# Patient Record
Sex: MALE | Race: BLACK OR AFRICAN AMERICAN | URBAN - METROPOLITAN AREA
[De-identification: names, ages, dates, MRNs, and addresses within clinical notes are randomized per-mention and may not be internally consistent; named-entity substitution may affect disease eponyms.]

---
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
## 2020-05-01 ENCOUNTER — OUTPATIENT (OUTPATIENT)
Dept: OUTPATIENT SERVICES | Facility: HOSPITAL | Age: 20
LOS: 1 days | End: 2020-05-01
Payer: MEDICAID

## 2020-05-01 ENCOUNTER — OUTPATIENT (OUTPATIENT)
Dept: OUTPATIENT SERVICES | Facility: HOSPITAL | Age: 20
LOS: 1 days | End: 2020-05-01

## 2020-05-01 PROCEDURE — G9001: CPT

## 2020-05-23 ENCOUNTER — EMERGENCY (EMERGENCY)
Facility: HOSPITAL | Age: 20
LOS: 0 days | Discharge: HOME | End: 2020-05-23
Attending: EMERGENCY MEDICINE
Payer: COMMERCIAL

## 2020-05-23 VITALS
RESPIRATION RATE: 16 BRPM | WEIGHT: 130.07 LBS | HEART RATE: 91 BPM | TEMPERATURE: 98 F | DIASTOLIC BLOOD PRESSURE: 70 MMHG | HEIGHT: 66 IN | SYSTOLIC BLOOD PRESSURE: 136 MMHG

## 2020-05-23 DIAGNOSIS — Z71.1 PERSON WITH FEARED HEALTH COMPLAINT IN WHOM NO DIAGNOSIS IS MADE: ICD-10-CM

## 2020-05-23 DIAGNOSIS — F99 MENTAL DISORDER, NOT OTHERWISE SPECIFIED: ICD-10-CM

## 2020-05-23 PROCEDURE — 99283 EMERGENCY DEPT VISIT LOW MDM: CPT

## 2020-05-23 NOTE — ED PROVIDER NOTE - OBJECTIVE STATEMENT
18 y/o M who denies pmhx presents after he was picked up by EMS as pt was asking for the time while holding a metal broom stick. Pt states he is homeless and carries his broomstick for protection but he did not threaten anyone with it. Pt is currently feeling well with no discomfort and no desire to hurt anyone. Pt in ED has been cooperative with no signs for aggression. Pt denies any HI/SI, or visual/auditory hallucinations. Pt left home and states parents are in [place] but he prefers not to talk as to why he is homeless.

## 2020-05-23 NOTE — ED ADULT TRIAGE NOTE - CHIEF COMPLAINT QUOTE
brought by ems for psych evaluation. pt was standing by someone house with a metal broomstick and they called 911. pt states he is homeless . he was also taking respiradol and stopped in 2018

## 2020-05-23 NOTE — ED PROVIDER NOTE - PSYCHIATRIC, MLM
Alert and oriented to person, place, time/situation. Pt appears hyperactive with normal mood, no apparent risk to self or others.

## 2020-05-23 NOTE — ED ADULT NURSE NOTE - OBJECTIVE STATEMENT
Patient is 19 year old male who presents to ED after police was called on him. Patient reports he knocked the door on a random house to ask the time,, pt states he was holding a broom because he is homeless but home owners felt threatened. Patient denies attempts to harm home owners, denies SI/HI, visual or auditory hallucinations, denies feelings of sadness, hopelessness or depression.

## 2020-05-23 NOTE — ED PROVIDER NOTE - CLINICAL SUMMARY MEDICAL DECISION MAKING FREE TEXT BOX
pt brought in by EMS/police for questionable behavior but exhibiting no psychosis or aggression while monitored for 2+ hours in ED - otherwise will dc.

## 2020-05-23 NOTE — ED PROVIDER NOTE - PATIENT PORTAL LINK FT
You can access the FollowMyHealth Patient Portal offered by [facility] by registering at the following website: [email or web address]. By joining [employer]’s FollowMyHealth portal, you will also be able to view your health information using other applications (apps) compatible with our system.

## 2020-05-23 NOTE — ED PROVIDER NOTE - CONSTITUTIONAL, MLM
Pt appears hyperactive, awake, alert, oriented to person, place, time/situation and in no apparent distress. normal...

## 2020-05-25 ENCOUNTER — INPATIENT (INPATIENT)
Facility: HOSPITAL | Age: 20
LOS: 8 days | Discharge: HOME | End: 2020-06-03
Attending: PSYCHIATRY & NEUROLOGY | Admitting: PSYCHIATRY & NEUROLOGY
Payer: MEDICAID

## 2020-05-25 VITALS
DIASTOLIC BLOOD PRESSURE: 77 MMHG | HEART RATE: 91 BPM | TEMPERATURE: 97 F | OXYGEN SATURATION: 100 % | SYSTOLIC BLOOD PRESSURE: 125 MMHG | RESPIRATION RATE: 18 BRPM

## 2020-05-25 DIAGNOSIS — F39 UNSPECIFIED MOOD [AFFECTIVE] DISORDER: ICD-10-CM

## 2020-05-25 LAB
ANION GAP SERPL CALC-SCNC: 8 MMOL/L — SIGNIFICANT CHANGE UP (ref 7–14)
APAP SERPL-MCNC: <5 UG/ML — LOW (ref 10–30)
BASOPHILS # BLD AUTO: 0.03 K/UL — SIGNIFICANT CHANGE UP (ref 0–0.2)
BASOPHILS NFR BLD AUTO: 0.6 % — SIGNIFICANT CHANGE UP (ref 0–1)
BUN SERPL-MCNC: 20 MG/DL — SIGNIFICANT CHANGE UP (ref 10–20)
CALCIUM SERPL-MCNC: 9.4 MG/DL — SIGNIFICANT CHANGE UP (ref 8.5–10.1)
CHLORIDE SERPL-SCNC: 104 MMOL/L — SIGNIFICANT CHANGE UP (ref 98–110)
CO2 SERPL-SCNC: 25 MMOL/L — SIGNIFICANT CHANGE UP (ref 17–32)
CREAT SERPL-MCNC: 0.9 MG/DL — SIGNIFICANT CHANGE UP (ref 0.3–1)
EOSINOPHIL # BLD AUTO: 0.31 K/UL — SIGNIFICANT CHANGE UP (ref 0–0.7)
EOSINOPHIL NFR BLD AUTO: 5.8 % — SIGNIFICANT CHANGE UP (ref 0–8)
ETHANOL SERPL-MCNC: <10 MG/DL — SIGNIFICANT CHANGE UP
GLUCOSE SERPL-MCNC: 106 MG/DL — HIGH (ref 70–99)
HCT VFR BLD CALC: 45.1 % — SIGNIFICANT CHANGE UP (ref 42–52)
HGB BLD-MCNC: 14.7 G/DL — SIGNIFICANT CHANGE UP (ref 14–18)
IMM GRANULOCYTES NFR BLD AUTO: 0.2 % — SIGNIFICANT CHANGE UP (ref 0.1–0.3)
LYMPHOCYTES # BLD AUTO: 1.73 K/UL — SIGNIFICANT CHANGE UP (ref 1.2–3.4)
LYMPHOCYTES # BLD AUTO: 32.2 % — SIGNIFICANT CHANGE UP (ref 20.5–51.1)
MCHC RBC-ENTMCNC: 29.1 PG — SIGNIFICANT CHANGE UP (ref 27–31)
MCHC RBC-ENTMCNC: 32.6 G/DL — SIGNIFICANT CHANGE UP (ref 32–37)
MCV RBC AUTO: 89.3 FL — SIGNIFICANT CHANGE UP (ref 80–94)
MONOCYTES # BLD AUTO: 0.51 K/UL — SIGNIFICANT CHANGE UP (ref 0.1–0.6)
MONOCYTES NFR BLD AUTO: 9.5 % — HIGH (ref 1.7–9.3)
NEUTROPHILS # BLD AUTO: 2.79 K/UL — SIGNIFICANT CHANGE UP (ref 1.4–6.5)
NEUTROPHILS NFR BLD AUTO: 51.7 % — SIGNIFICANT CHANGE UP (ref 42.2–75.2)
NRBC # BLD: 0 /100 WBCS — SIGNIFICANT CHANGE UP (ref 0–0)
PLATELET # BLD AUTO: 207 K/UL — SIGNIFICANT CHANGE UP (ref 130–400)
POTASSIUM SERPL-MCNC: 4.1 MMOL/L — SIGNIFICANT CHANGE UP (ref 3.5–5)
POTASSIUM SERPL-SCNC: 4.1 MMOL/L — SIGNIFICANT CHANGE UP (ref 3.5–5)
RBC # BLD: 5.05 M/UL — SIGNIFICANT CHANGE UP (ref 4.7–6.1)
RBC # FLD: 12.2 % — SIGNIFICANT CHANGE UP (ref 11.5–14.5)
SALICYLATES SERPL-MCNC: <0.3 MG/DL — LOW (ref 4–30)
SODIUM SERPL-SCNC: 137 MMOL/L — SIGNIFICANT CHANGE UP (ref 135–146)
WBC # BLD: 5.38 K/UL — SIGNIFICANT CHANGE UP (ref 4.8–10.8)
WBC # FLD AUTO: 5.38 K/UL — SIGNIFICANT CHANGE UP (ref 4.8–10.8)

## 2020-05-25 PROCEDURE — 93010 ELECTROCARDIOGRAM REPORT: CPT

## 2020-05-25 PROCEDURE — 99285 EMERGENCY DEPT VISIT HI MDM: CPT

## 2020-05-25 PROCEDURE — 90792 PSYCH DIAG EVAL W/MED SRVCS: CPT | Mod: 95

## 2020-05-25 RX ORDER — RISPERIDONE 4 MG/1
1 TABLET ORAL ONCE
Refills: 0 | Status: COMPLETED | OUTPATIENT
Start: 2020-05-25 | End: 2020-05-25

## 2020-05-25 RX ADMIN — RISPERIDONE 1 MILLIGRAM(S): 4 TABLET ORAL at 22:40

## 2020-05-25 NOTE — ED BEHAVIORAL HEALTH NOTE - BEHAVIORAL HEALTH NOTE
PRE-HOSPITAL COURSE  ===================  SOURCE:  Second-hand information via EMR documentation and primary RN.  DETAILS: Patient self-presented to the ED with no noted incidents.   ===================  ED COURSE:    Calm and coopearive. Follows commands, occasinoaly rocks back and forth. Denies SI. Ridpersdal 1mg at 10mg, able to undderstyand. Hygien – unkempt, malodourous, he received food and drank. No urine yet. Naramón.   SOURCE:  Second-hand information via EMR documentation and primary RN [employer].  ARRIVAL:  Patient self-presented to the ED with no noted incidents.  BELONGINGS:  Clothing; nothing of note in belongings.   BEHAVIOR: Complied with triage protocols –provided blood/no urine as of yet, changed into a hospital gown, allowed staff to wand/collect belongings without incident, denies SI, endorses HI - no plan identified, noted to follow commands, is cooperative, but is mildly anxious - observed rocking back and forth on the stretcher, speech is at a normal rate, appropriate volume, clear/audible, clear articulation/tone, linear thought content, fair impulse control, fair insight/judgment, no noted AVH/delusions, good eye contact, poor hygiene/grooming - is malodorous, unkempt, is AXO3, ate a meal and has been drinking fluids, no aggression or behavioral issues reported.  TREATMENT: Given Risperdal, restraints, security interventions or manual holds required.   VISITORS: Is unaccompanied by family or social supports. PRE-HOSPITAL COURSE  ===================  SOURCE:  Second-hand information via EMR documentation and primary RN.  DETAILS: Patient self-presented to the ED with no noted incidents.   ===================  ED COURSE:    SOURCE:  Second-hand information via EMR documentation and primary RN Genet.  BELONGINGS:  Clothing; nothing of note in belongings.   BEHAVIOR: Complied with triage protocols –provided blood/no urine as of yet, changed into a hospital gown, allowed staff to wand/collect belongings without incident, denies SI, endorses HI - no plan identified, noted to follow commands, is cooperative, but is mildly anxious - observed rocking back and forth on the stretcher, speech is at a normal rate, appropriate volume, clear/audible, clear articulation/tone, linear thought content, fair impulse control, fair insight/judgment, no noted AVH/delusions, good eye contact, poor hygiene/grooming - is malodorous, unkempt, is AXO3, ate a meal and has been drinking fluids, no aggression or behavioral issues reported.  TREATMENT: Given Risperdal 1mg ~22:18, no restraints, security interventions or manual holds required.   VISITORS: Is unaccompanied by family or social supports.

## 2020-05-25 NOTE — ED BEHAVIORAL HEALTH ASSESSMENT NOTE - DETAILS
"I don't care" HPI "we all nuts" no details known reports traumatized many times, derails when pressed for details self d/w EM

## 2020-05-25 NOTE — ED PEDIATRIC TRIAGE NOTE - CHIEF COMPLAINT QUOTE
patient has thoughts of harming others. alert and in no distress. patient has thoughts of harming others. alert and in no distress. 1:1 sit initiated in triage.

## 2020-05-25 NOTE — ED BEHAVIORAL HEALTH ASSESSMENT NOTE - ORIENTED TO PLACE
----- Message from Autumn Bella sent at 1/24/2018  9:35 AM CST -----    Calling to  Speak to the  Nurse about  The  Med  For  Toe  fungas ,  Pt  Received a  Script  And is  Not  Sure  This  Is  The   Right  Med // please call //[phone]//    Yes

## 2020-05-25 NOTE — ED PROVIDER NOTE - OBJECTIVE STATEMENT
19yr old male hx of schizophrenia on risperidol here for psych eval. pt reports brought himself here because he was having thoughts of hurting others. Pt states im not going to tell you the details. denies drug use. no SI.

## 2020-05-25 NOTE — ED PROVIDER NOTE - PROGRESS NOTE DETAILS
s/o Dr. Bhatt. await completion of consult by telepsych Telepsych will look for a bed for patient to be admitted

## 2020-05-25 NOTE — ED BEHAVIORAL HEALTH ASSESSMENT NOTE - VIOLENCE RISK FACTORS:
Violent ideation/threat/speech/History of being victimized/traumatized/Feeling of being under threat and being unable to control threat/Noncompliance with treatment/Substance abuse/Irritability/Impulsivity

## 2020-05-25 NOTE — ED PROVIDER NOTE - NS ED ROS FT
Review of Systems    Constitutional: (-) fever  Eyes/ENT: (-) blurry vision, (-) epistaxis  Cardiovascular: (-) chest pain, (-) syncope  Respiratory: (-) cough, (-) shortness of breath  Gastrointestinal: (-) vomiting, (-) diarrhea  Musculoskeletal: (-) neck pain, (-) back pain, (-) joint pain  Integumentary: (-) rash, (-) edema  Neurological: (-) headache, (-) altered mental status  Psychiatric: see hpi, no hallucinations  Allergic/Immunologic: (-) pruritus  All other systems are negative except as mentioned above
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
## 2020-05-25 NOTE — ED PEDIATRIC NURSE NOTE - OBJECTIVE STATEMENT
pt presents to ED c/o homicidal thoughts pt states he brought himself here because of his thoughts. pt states "I want to hurt multiple people I don't want to name specifics but it is multiple people. " pt denies plan at this time, but states "I could have a plan if I have access." pt currently calm and cooperative. 1:1 initiated in triage, but undressed belongings given to security ( 1 pair of shoes , 2 shirts, 1 jacket, 1 pants an d1 underwear) . pt states this has happened before and was seen here .

## 2020-05-25 NOTE — ED PEDIATRIC NURSE NOTE - LOW RISK FALLS INTERVENTIONS (SCORE 7-11)
Oriented - self; Oriented - place; Oriented - time
Orientation to room/Use of non-skid footwear for ambulating patients, use of appropriate size clothing to prevent risk of tripping/Bed in low position, brakes on
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
## 2020-05-25 NOTE — ED PROVIDER NOTE - PHYSICAL EXAMINATION
CON: calm ENT:  neck supple,  CV: rrr, equal pulses b/l, RESP: cta b/l, GI:  soft, nontender, no rebound, no guarding, SKIN: no rash, MSK: no deformities, NEURO: no gross motor or sensory deficit Psychiatric: weird affect. cooperative
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
## 2020-05-25 NOTE — ED PEDIATRIC NURSE NOTE - NS ED NURSE RECORD ANOTHER HT AND WT
HPI     DLS: 04/13/2018    OHTN / + fam h/o glc - mom/brother   S/p phaco w/IOL OS 3/17/16   S/p CE with IOL OD ([facility]) - 4/13/17     Patient states she is no longer seeing the flashes but is still seeing a   floater in left left eye. Patient states it looks like a piece of hair   that just floats around. Patient states at her last visit she was very   stressed. She says her blood pressure and BS has improved since her last   visit. She also says that her left eye is sore/pressure.      AT's PRN OU    Last edited by Kristin Jaimes on 5/7/2018  1:13 PM. (History)            Assessment /Plan     For exam results, see Encounter Report.        PVD (posterior vitreous detachment), left eye                  No retinal holes, tears, or detachments              Discussed RD precautions           Type 2 diabetes mellitus with hyperglycemia, with long-term current use of insulin  Essential hypertension              No retinopathy, monitor yearly    Presbyopia   Rx specs    RTC 1 year, sooner PRN                      Yes

## 2020-05-25 NOTE — ED BEHAVIORAL HEALTH ASSESSMENT NOTE - OTHER PAST PSYCHIATRIC HISTORY (INCLUDE DETAILS REGARDING ONSET, COURSE OF ILLNESS, INPATIENT/OUTPATIENT TREATMENT)
multiple ED visits. hx of tx with risperdal. references "1 or two nights" in the hospital, unclear if ED/CPEP vs ever admitted.

## 2020-05-25 NOTE — ED BEHAVIORAL HEALTH ASSESSMENT NOTE - DESCRIPTION (FIRST USE, LAST USE, QUANTITY, FREQUENCY, DURATION)
1ppd occasional last about 2 months ago occasional last 1 month ago occasional last 1 month ago (insufflated)

## 2020-05-26 DIAGNOSIS — F84.0 AUTISTIC DISORDER: ICD-10-CM

## 2020-05-26 DIAGNOSIS — F17.200 NICOTINE DEPENDENCE, UNSPECIFIED, UNCOMPLICATED: ICD-10-CM

## 2020-05-26 LAB
AMPHET UR-MCNC: NEGATIVE — SIGNIFICANT CHANGE UP
APPEARANCE UR: CLEAR — SIGNIFICANT CHANGE UP
BACTERIA # UR AUTO: ABNORMAL
BARBITURATES UR SCN-MCNC: NEGATIVE — SIGNIFICANT CHANGE UP
BENZODIAZ UR-MCNC: NEGATIVE — SIGNIFICANT CHANGE UP
BILIRUB UR-MCNC: NEGATIVE — SIGNIFICANT CHANGE UP
COCAINE METAB.OTHER UR-MCNC: NEGATIVE — SIGNIFICANT CHANGE UP
COLOR SPEC: YELLOW — SIGNIFICANT CHANGE UP
DIFF PNL FLD: NEGATIVE — SIGNIFICANT CHANGE UP
DRUG SCREEN 1, URINE RESULT: SIGNIFICANT CHANGE UP
EPI CELLS # UR: ABNORMAL /HPF
GLUCOSE UR QL: NEGATIVE MG/DL — SIGNIFICANT CHANGE UP
KETONES UR-MCNC: NEGATIVE — SIGNIFICANT CHANGE UP
LEUKOCYTE ESTERASE UR-ACNC: ABNORMAL
METHADONE UR-MCNC: NEGATIVE — SIGNIFICANT CHANGE UP
NITRITE UR-MCNC: NEGATIVE — SIGNIFICANT CHANGE UP
OPIATES UR-MCNC: NEGATIVE — SIGNIFICANT CHANGE UP
PCP UR-MCNC: NEGATIVE — SIGNIFICANT CHANGE UP
PH UR: 7 — SIGNIFICANT CHANGE UP (ref 5–8)
PROPOXYPHENE QUALITATIVE URINE RESULT: NEGATIVE — SIGNIFICANT CHANGE UP
PROT UR-MCNC: NEGATIVE MG/DL — SIGNIFICANT CHANGE UP
RBC CASTS # UR COMP ASSIST: SIGNIFICANT CHANGE UP /HPF
SARS-COV-2 RNA SPEC QL NAA+PROBE: SIGNIFICANT CHANGE UP
SP GR SPEC: 1.02 — SIGNIFICANT CHANGE UP (ref 1.01–1.03)
THC UR QL: NEGATIVE — SIGNIFICANT CHANGE UP
UROBILINOGEN FLD QL: 0.2 MG/DL — SIGNIFICANT CHANGE UP (ref 0.2–0.2)
WBC UR QL: ABNORMAL /HPF

## 2020-05-26 PROCEDURE — 99232 SBSQ HOSP IP/OBS MODERATE 35: CPT

## 2020-05-26 RX ORDER — HYDROXYZINE HCL 10 MG
50 TABLET ORAL EVERY 6 HOURS
Refills: 0 | Status: DISCONTINUED | OUTPATIENT
Start: 2020-05-26 | End: 2020-06-03

## 2020-05-26 RX ORDER — NICOTINE POLACRILEX 2 MG
2 GUM BUCCAL
Refills: 0 | Status: DISCONTINUED | OUTPATIENT
Start: 2020-05-26 | End: 2020-06-03

## 2020-05-26 RX ORDER — HALOPERIDOL DECANOATE 100 MG/ML
5 INJECTION INTRAMUSCULAR EVERY 6 HOURS
Refills: 0 | Status: DISCONTINUED | OUTPATIENT
Start: 2020-05-26 | End: 2020-06-03

## 2020-05-26 RX ORDER — RISPERIDONE 4 MG/1
1 TABLET ORAL
Refills: 0 | Status: DISCONTINUED | OUTPATIENT
Start: 2020-05-26 | End: 2020-05-28

## 2020-05-26 RX ORDER — NICOTINE POLACRILEX 2 MG
1 GUM BUCCAL DAILY
Refills: 0 | Status: DISCONTINUED | OUTPATIENT
Start: 2020-05-26 | End: 2020-06-03

## 2020-05-26 RX ORDER — HALOPERIDOL DECANOATE 100 MG/ML
5 INJECTION INTRAMUSCULAR EVERY 6 HOURS
Refills: 0 | Status: DISCONTINUED | OUTPATIENT
Start: 2020-05-26 | End: 2020-05-26

## 2020-05-26 RX ADMIN — RISPERIDONE 1 MILLIGRAM(S): 4 TABLET ORAL at 11:28

## 2020-05-26 RX ADMIN — RISPERIDONE 1 MILLIGRAM(S): 4 TABLET ORAL at 20:43

## 2020-05-26 NOTE — H&P ADULT - NSHPPHYSICALEXAM_GEN_ALL_CORE
Constitutional: NAD, well-nourished, non toxic appearing   HEENT: Airway patent, moist MM, no erythema/swelling/deformity of oral structures. EOMI, PERRLA.  CV: regular rate, regular rhythm, well-perfused extremities, 2+ b/l DP and radial pulses equal.  Lungs: BCTA, no increased WOB.  ABD: NTND, no guarding or rebound, no pulsatile mass, no hernias.   MSK: Neck supple, nontender, nl ROM, no stepoff. Chest nontender. Back nontender in TLS spine or to b/l bony structures or flanks. Ext nontender, nl rom, no deformity.   INTEG: Skin warm, dry, no rash.  NEURO: A&Ox3, normal strength, nl sensation throughout, normal speech.   PSYCH: Denies SI/HI/hallucinations ICU Vital Signs Last 24 Hrs  T(C): 35.6 (26 May 2020 05:29), Max: 37.1 (26 May 2020 02:41)  T(F): 96.1 (26 May 2020 05:29), Max: 98.7 (26 May 2020 02:41)  HR: 67 (26 May 2020 05:29) (67 - 106)  BP: 131/67 (26 May 2020 05:29) (101/63 - 131/67)    RR: 18 (26 May 2020 05:29) (17 - 18)  SpO2: 100% (26 May 2020 04:00) (100% - 100%)      Constitutional: NAD, well-nourished, non toxic appearing   HEENT: Airway patent, moist MM, no erythema/swelling/deformity of oral structures. EOMI, PERRLA.  CV: regular rate, regular rhythm, well-perfused extremities, 2+ b/l DP and radial pulses equal.  Lungs: BCTA, no increased WOB.  ABD: NTND, no guarding or rebound, no pulsatile mass, no hernias.   MSK: Neck supple, nontender, nl ROM, no stepoff. Chest nontender. Back nontender in TLS spine or to b/l bony structures or flanks. Ext nontender, nl rom, no deformity.   INTEG: Skin warm, dry, no rash.  NEURO: A&Ox3, normal strength, nl sensation throughout, normal speech.   PSYCH: Denies SI/hallucinations

## 2020-05-26 NOTE — H&P ADULT - NSHPLABSRESULTS_GEN_ALL_CORE
14.7   5.38  )-----------( 207      ( 25 May 2020 22:56 )             45.1     05-25    137  |  104  |  20  ----------------------------<  106<H>  4.1   |  25  |  0.9    Ca    9.4      25 May 2020 22:56      Alcohol, Blood (05.25.20 @ 20:19)    Alcohol, Blood: <10 mg/dL    Acetaminophen Level, Serum (05.25.20 @ 22:56)    Acetaminophen Level, Serum: <5.0 ug/mL
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
## 2020-05-26 NOTE — PROGRESS NOTE BEHAVIORAL HEALTH - NSBHADDHXSUBSTFT_PSY_A_CORE
Pt endorses smoking 1/2 pack of cigarettes daily. Endorses occasional drinking, however states "rare". Denies other illicit substances, denies marijuana and heroin despite chart review stating pt endorsing occasional marijuana and heroin use. Pt endorses smoking 1/2 pack of cigarettes daily for the last year. Endorses occasional drinking, however states "rare", last use 1 month ago. Endorses smoking marijuana one-time last month. Denies other illicit substances; denies heroin use despite chart review showing pt endorsing previous heroin use in ED.
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
## 2020-05-26 NOTE — PROGRESS NOTE BEHAVIORAL HEALTH - NSBHADMITMEDEDUDETAILS_A_CORE FT
Reeducated pt on risks and benefits of risperdal, and side effects profile. Pt verbalized understanding.

## 2020-05-26 NOTE — PATIENT PROFILE BEHAVIORAL HEALTH - TEACHING/LEARNING FACTORS INFLUENCE READINESS TO LEARN
Date of Service: 08/20/2018    HISTORY OF PRESENT ILLNESS:  This patient is a most pleasant 79-year-old male with active medical conditions including history of noninsulin-dependent diabetes, severe COPD (followed by Dr. Barlow, pulmonary medicine) a history of prostate carcinoma, hypertension, and osteoarthritis of shoulders (status post intervention of right shoulder with Dr. Omer),  as well as diffuse rheumatoid arthritis, of the lungs/bronchiectasis (followed DrHattie by [facility] and medically managed), who presents for followup of chronic active medical conditions.  He tells me in general he has been feeling pretty well.  He states his COPD has been relatively stable.  He does cough up some thick mucus.  It is not unusual for him.  He states that it seems to be a little harder at times to cough it up.  Denies any fever or chills.  Does state upon questioning today, that in fact, he does have some Mucinex at home, but has not used it and will restart it.    He is noticing some bruising, but states he did bump the door.  He states he did have some urinary issues and in fact had a urinary accident when out golfing.  He states if he gets quite short of breath this seems to bring this on.    SOCIAL HISTORY, ALLERGIES AND MEDICATIONS:  Reviewed from Epic encounter of 08/20/2018.    PHYSICAL EXAMINATION:  VITAL SIGNS:  Per the nurse, blood pressure 130/64, pulse 68 and regular, respiratory rate 16, weight is 189 pounds, 5 feet 9 inches tall.  CONSTITUTIONAL SYMPTOMS:  Patient is in no acute distress.  HEENT: Head normocephalic and atraumatic.  Pupils are equal and reactive.  EOMI.  Sclerae and conjunctivae are clear.  External ears appear normal.  Nose normal.  NECK:  Supple without any thyromegaly or other masses.   LYMPHATICS:  Palpation of the anterior/posterior cervical and supraclavicular regions reveals no evidence of any lymphadenopathy.    LUNGS:  Breath sounds are clear, but decreased throughout.  A few fine  crackles are noted at the bases.    HEART:  Regular rate and rhythm, normal S1 and S2, no murmurs, rubs, gallops or thrills are noted.    EXTREMITIES AND MUSCULOSKELETAL:  Muscle strength and tone are normal in the upper and lower extremities.  No evidence of clubbing, cyanosis or edema.  Dorsalis pedis pulses are strong and intact bilaterally.  He does have 1+ lower extremity edema to the mid calf, left and below mid calf on the right.  There is no skin breakdown or other concerning findings.  NEUROLOGIC:  Patient is alert and oriented x 3, pleasant and cooperative.  Gait is normal.  Sensation is intact in the extremities.   VASCULAR:  Carotid upstrokes are brisk bilaterally.  There are no carotid bruits.       LABORATORY EVALUATION:  This was reviewed with the patient.  Comprehensive metabolic panel reveals normal electrolytes other than a carbon dioxide of 33.  Liver function studies normal. Creatinine is increased at 1.56, glucose 84.  Cholesterol 142, LDL 73, HDL 54, triglycerides 74.  Iron level is 32.  Hemoglobin stable at 10.9 grams per deciliter.  White count and platelet count are normal. MCV is stable 87.3.  PSA is less than 0.01.    ASSESSMENT AND PLAN:  1.  Chronic obstructive pulmonary disease.  He is under the care and direction of Dr. Barlow. He is medically managed.  He is going to restart Mucinex to help loosen the phlegm.  2.  History of prostate carcinoma.  PSA is undetectable.  3.  Hypertension.  Blood pressure is well controlled.  4.  History of noninsulin-dependent diabetes.  Blood sugars are normal.  5.  Renal insufficiency, stage III.  As noted, it seems to be rising from his baseline of about 1.3 or so in the past.  He understands the importance of hydration.  Will continue to monitor this.  Talked briefly about anti-inflammatories.  It does not appear he takes this, at least on a regular basis.  6.  Anemia with low iron.  We talked about this.  He does not complain of any obvious  colorectal issues or abdominal pain, etc.  At this point, will restart Iron on Zpamht-Glpkbmasi-Gkiylt as he has this at home.  He will see us in 4 months' time.  He had an EGD with Dr. Castillo last year.  He is not due for colonoscopy until 2025.  Biopsy in this area showed some nonspecific reactive changes, but did not show any intestinal metaplasia.    Again, he will see us in 3 months' time at his kind request.      Dictated By: Gunner Alan MD  Signing Provider: Gunner Alan MD    DN/SMR ([id number])  DD: 08/20/2018 11:02:36 TD: 08/21/2018 05:24:47    Copy Sent To:    none

## 2020-05-26 NOTE — ED PEDIATRIC NURSE REASSESSMENT NOTE - NS ED NURSE REASSESS COMMENT FT2
Pt was going to bathroom to urinate, but he is refusing anyone to watch him as per protocol. Pt getting agitaede, sat down on the floor in a corner. He was able to listyenined to the RN and got up. Pt then said if some one shd watch him them he will not urinate, he agreed for partially close the door while we can still see him in view, but he later turn down the offer and came back to bed. Constant onservation continue in full view. Pt is going to sleep.
Received pt alert and oriented in bed , calm all the time he was her as per out going RN. Pt denies pains, is occasionally seen rocking his body back and forward. Pt obeys instructions, he is homicidal but has no plans put in place. 1;1 constant observation in place. Pt blood drown as per MD for lab work. No respiratory distress noted. Pt had tele video call, was calm, eat some food provided. Safety precoutions in place.
Pt is calm, was sleeping when his EMT came to transferred him to [facility] for further treatment as an admitted pt. Pt remains calm, ff command, security returned his belonging to his. Pt left the unit on a scratcher with his 1;1 staff to accompany him on the ambulance.
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
## 2020-05-26 NOTE — H&P ADULT - ASSESSMENT
20 yo M transfer from [facility] with psychiatric hx of PTSD, ASD and polysubstance abuse presents with homicidal thoughts    admitted to psych   care as per IPP  smoking cessation - nicotine patch   f/u drug screen   regular diet   dvt ppx - ambulate

## 2020-05-27 LAB
ALBUMIN SERPL ELPH-MCNC: 4.1 G/DL — SIGNIFICANT CHANGE UP (ref 3.5–5.2)
ALP SERPL-CCNC: 71 U/L — SIGNIFICANT CHANGE UP (ref 30–115)
ALT FLD-CCNC: 20 U/L — SIGNIFICANT CHANGE UP (ref 13–38)
ANION GAP SERPL CALC-SCNC: 6 MMOL/L — LOW (ref 7–14)
AST SERPL-CCNC: 20 U/L — SIGNIFICANT CHANGE UP (ref 13–38)
BILIRUB SERPL-MCNC: 0.5 MG/DL — SIGNIFICANT CHANGE UP (ref 0.2–1.2)
BUN SERPL-MCNC: 17 MG/DL — SIGNIFICANT CHANGE UP (ref 10–20)
CALCIUM SERPL-MCNC: 9.3 MG/DL — SIGNIFICANT CHANGE UP (ref 8.5–10.1)
CHLORIDE SERPL-SCNC: 105 MMOL/L — SIGNIFICANT CHANGE UP (ref 98–110)
CHOLEST SERPL-MCNC: 144 MG/DL — SIGNIFICANT CHANGE UP (ref 82–200)
CO2 SERPL-SCNC: 29 MMOL/L — SIGNIFICANT CHANGE UP (ref 17–32)
CREAT SERPL-MCNC: 1 MG/DL — SIGNIFICANT CHANGE UP (ref 0.3–1)
GLUCOSE SERPL-MCNC: 84 MG/DL — SIGNIFICANT CHANGE UP (ref 70–99)
HDLC SERPL-MCNC: 46 MG/DL — SIGNIFICANT CHANGE UP
LIPID PNL WITH DIRECT LDL SERPL: 89 MG/DL — SIGNIFICANT CHANGE UP (ref 4–129)
POTASSIUM SERPL-MCNC: 4.6 MMOL/L — SIGNIFICANT CHANGE UP (ref 3.5–5)
POTASSIUM SERPL-SCNC: 4.6 MMOL/L — SIGNIFICANT CHANGE UP (ref 3.5–5)
PROLACTIN SERPL-MCNC: 18 NG/ML — SIGNIFICANT CHANGE UP (ref 4.1–18.4)
PROT SERPL-MCNC: 6.5 G/DL — SIGNIFICANT CHANGE UP (ref 6.1–8)
SODIUM SERPL-SCNC: 140 MMOL/L — SIGNIFICANT CHANGE UP (ref 135–146)
TOTAL CHOLESTEROL/HDL RATIO MEASUREMENT: 3.1 RATIO — LOW (ref 4–5.5)
TRIGL SERPL-MCNC: 70 MG/DL — SIGNIFICANT CHANGE UP (ref 10–149)
TSH SERPL-MCNC: 0.19 UIU/ML — LOW (ref 0.27–4.2)

## 2020-05-27 PROCEDURE — 99231 SBSQ HOSP IP/OBS SF/LOW 25: CPT

## 2020-05-27 RX ADMIN — RISPERIDONE 1 MILLIGRAM(S): 4 TABLET ORAL at 20:09

## 2020-05-27 RX ADMIN — RISPERIDONE 1 MILLIGRAM(S): 4 TABLET ORAL at 08:04

## 2020-05-27 NOTE — PROGRESS NOTE ADULT - SUBJECTIVE AND OBJECTIVE BOX
pt stable alert in NAD  no new complaints    PYSCHOSIS  ^PSYCH EVAL  Handoff  MEWS Score  No pertinent past medical history  Psychosis  Tobacco use disorder  Autism spectrum disorder  Mood disorder  No significant past surgical history  PSYCH EVAL  90+    HEALTH ISSUES - PROBLEM Dx:  Tobacco use disorder  Autism spectrum disorder  Mood disorder        PAST MEDICAL & SURGICAL HISTORY:  No pertinent past medical history  No significant past surgical history    No Known Allergies      FAMILY HISTORY:      haloperidol     Tablet 5 milliGRAM(s) Oral every 6 hours PRN  hydrOXYzine hydrochloride 50 milliGRAM(s) Oral every 6 hours PRN  LORazepam     Tablet 2 milliGRAM(s) Oral every 6 hours PRN  nicotine  Polacrilex Gum 2 milliGRAM(s) Oral every 2 hours PRN  nicotine -   7 mG/24Hr(s) Patch 1 Patch Transdermal daily  risperiDONE   Tablet 1 milliGRAM(s) Oral two times a day      T(C): 35.7 (05-27-20 @ 06:24), Max: 35.7 (05-27-20 @ 06:24)  HR: 16 (05-27-20 @ 06:24) (16 - 83)  BP: 121/59 (05-27-20 @ 06:24) (115/69 - 121/59)  RR: 18 (05-27-20 @ 06:24) (16 - 18)  SpO2: --    PE;  general:  no acute cahnges noted    Lungs:    Heart:    EXT:    Neuro:  no defciits      14.7  45.1  5.38  20  0.9  4.1  106      05-25    137  |  104  |  20  ----------------------------<  106<H>  4.1   |  25  |  0.9    Ca    9.4      25 May 2020 22:56                                  14.7   5.38  )-----------( 207      ( 25 May 2020 22:56 )             45.1       CAPILLARY BLOOD GLUCOSE
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
## 2020-05-28 DIAGNOSIS — Z71.89 OTHER SPECIFIED COUNSELING: ICD-10-CM

## 2020-05-28 PROCEDURE — 99231 SBSQ HOSP IP/OBS SF/LOW 25: CPT

## 2020-05-28 RX ORDER — RISPERIDONE 4 MG/1
2 TABLET ORAL
Refills: 0 | Status: DISCONTINUED | OUTPATIENT
Start: 2020-05-28 | End: 2020-06-03

## 2020-05-28 RX ADMIN — RISPERIDONE 1 MILLIGRAM(S): 4 TABLET ORAL at 08:09

## 2020-05-28 RX ADMIN — Medication 1 PATCH: at 20:10

## 2020-05-28 RX ADMIN — Medication 1 PATCH: at 08:09

## 2020-05-28 RX ADMIN — RISPERIDONE 2 MILLIGRAM(S): 4 TABLET ORAL at 20:07

## 2020-05-28 NOTE — PROGRESS NOTE BEHAVIORAL HEALTH - NSBHFUPINTERVALHXFT_PSY_A_CORE
Pt seen and evaluated during treatment team, chart reviewed. As per nursing report, pt had no acute events overnight, medication compliant and no behavioral issues. On evaluation, pt endorses improved mood, sleep and appetite. Pt states anger is improved, denies current HI, intent and plan. Pt agreed to discuss details of event leading to HI prior to hospitalization- pt endorses paranoia, states he is being followed by the  which upsets him, states if they continue to follow him he will "deal with them" while making a motion of his fist hitting his hand. Pt states he feels safe on the unit. Denies AVH, paranoia. Pt denies medication side effects. Denies SI. Pt seen and evaluated during treatment team, chart reviewed. As per nursing report, pt had no acute events overnight, medication compliant and no behavioral issues. On evaluation, pt endorses improved mood, sleep and appetite. Pt states anger is improved, denies current HI, intent and plan. Pt agreed to discuss details of event leading to HI prior to hospitalization- pt endorses paranoia, states he is being followed by the  which upsets him, states if they continue to follow him he will "deal with them" while making a motion of his fist hitting his hand. Also states he believes the  can hear him. Pt states he feels safe on the unit. Denies AVH. Pt denies medication side effects. Denies SI. Pt seen and evaluated during treatment team, chart reviewed. As per nursing report, pt had no acute events overnight, medication compliant and no behavioral issues. On evaluation, pt endorses improved mood, sleep and appetite. Pt states anger is improved, denies current HI, intent and plan. Pt agreed to discuss details of event leading to HI prior to hospitalization- pt endorses paranoia, states he is being followed by the  which upsets him, states if they continue to follow him he will "deal with them" while making a motion of his fist hitting his hand. Also states he believes the  can hear him. Pt states he feels safe on the unit and is in good behavioral control. Denies AVH. Pt denies medication side effects. Denies SI.

## 2020-05-28 NOTE — CHART NOTE - NSCHARTNOTEFT_GEN_A_CORE
Vicki remains on the unit for continued treatment, safety and observation. He met with treatment team to discuss his treatment plan and medications.  He engaged in interview with staff and reports feeling better but is still isolative on the unit. During team meeting NP and SW discussed with Vicki current goals while on the unit. Vicki reported that he is fine and feels better. SW and NP continued to discuss the HI that initially brought him into the hospital. Vicki reported that he wants “[facility]” because people have been following him. SW asked who has been following him and Vicki responded  have been following him his whole life and can hear him right now. Pt began to get very agitated and his voice increased. Vicki reports not caring if he gets locked up and that we can lock him in here if we want. SW and NP were able to redirect the pt and bring him back to baseline. [facility] is still being explored with HRA form to be completed for potential placement within their residential program. SW will continue to follow.    Mood – Agitated  Sleep - Good  Appetite - Good  ADLs - Good  Thought Process – Paranoid   Observation – [id number]    Tootie will continue to meet with  one on one and with treatment team daily.  At this time patient is not psychiatrically stable for discharge. Vicki remains on the unit for continued treatment, safety and observation. He met with treatment team to discuss his treatment plan and medications.  He engaged in interview with staff and reports feeling better but is still isolative on the unit. During team meeting NP and SW discussed with Vicki current goals while on the unit. Vicki reported that he is fine and feels better. SW and NP continued to discuss the HI that initially brought him into the hospital. Vicki reported that he wants “[facility]” because people have been following him. SW asked who has been following him and Vicki responded  have been following him his whole life and can hear him right now. Pt began to get very agitated and his voice increased. Vicki reports not caring if he gets locked up and that we can lock him in here if we want. SW and NP were able to redirect the pt and bring him back to baseline. [facility] is still being explored with HRA form to be completed for potential placement within their residential program. SW will continue to follow.    Mood – Agitated  Sleep - Good  Appetite - Good  ADLs - Good  Thought Process – Paranoid   Observation – [id number]    Vicki will continue to meet with  one on one and with treatment team daily.  At this time patient is not psychiatrically stable for discharge.

## 2020-05-29 LAB
A1C WITH ESTIMATED AVERAGE GLUCOSE RESULT: 5.5 % — SIGNIFICANT CHANGE UP (ref 4–5.6)
ESTIMATED AVERAGE GLUCOSE: 111 MG/DL — SIGNIFICANT CHANGE UP (ref 68–114)

## 2020-05-29 PROCEDURE — 99231 SBSQ HOSP IP/OBS SF/LOW 25: CPT

## 2020-05-29 RX ADMIN — Medication 1 PATCH: at 08:13

## 2020-05-29 RX ADMIN — RISPERIDONE 2 MILLIGRAM(S): 4 TABLET ORAL at 20:07

## 2020-05-29 RX ADMIN — RISPERIDONE 2 MILLIGRAM(S): 4 TABLET ORAL at 08:12

## 2020-05-29 NOTE — PROGRESS NOTE BEHAVIORAL HEALTH - NSBHFUPINTERVALHXFT_PSY_A_CORE
Pt seen and evaluated, chart reviewed. As per nursing report, pt had no acute events overnight, medication compliant and no behavioral issues. On evaluation, pt endorses improved mood, sleep and appetite. Pt states anger is improved, denies current HI, intent and plan. Endorses continued paranoia of being followed by the , encouraged and educated pt on responses other than anger and violence, pt verbalized acknowledgement. Pt remains in good behavioral control. Denies AVH. Pt denies medication side effects. Denies SI. Encouraged groups. Pt educated on risks and benefits of [facility], pt states will consider. Pt seen and evaluated, chart reviewed. As per nursing report, pt had no acute events overnight, medication compliant and no behavioral issues. On evaluation, pt endorses improved mood, sleep and appetite. Pt states anger is improved, denies current HI, intent and plan. Endorses continued paranoia of being followed by the , encouraged and educated pt on responses other than anger and violence, pt verbalized acknowledgement. Pt remains in good behavioral control, states feels safe on the unit. Denies AVH. Pt denies medication side effects. Denies SI. Encouraged groups. Pt educated on risks and benefits of [facility], pt states will consider.

## 2020-05-29 NOTE — CHART NOTE - NSCHARTNOTEFT_GEN_A_CORE
SW spoke with [facility] about [facility]. [facility] is willing to accept pt but would need HRA form completed. SW spoke with Pt who consented to and agreed for HRA form to be completed. Pt signed consent form and SW completed and submitted HRA form. Copy was also submitted to [facility] for review. SW will continue to follow.

## 2020-05-29 NOTE — PROGRESS NOTE ADULT - SUBJECTIVE AND OBJECTIVE BOX
pt stable alert in NAD  no new complaints    PYSCHOSIS  ^PSYCH EVAL  Handoff  MEWS Score  No pertinent past medical history  Psychosis  Tobacco use disorder  Autism spectrum disorder  Mood disorder  No significant past surgical history  PSYCH EVAL  90+    HEALTH ISSUES - PROBLEM Dx:  Tobacco use disorder  Autism spectrum disorder  Mood disorder        PAST MEDICAL & SURGICAL HISTORY:  No pertinent past medical history  No significant past surgical history    No Known Allergies      FAMILY HISTORY:      haloperidol     Tablet 5 milliGRAM(s) Oral every 6 hours PRN  hydrOXYzine hydrochloride 50 milliGRAM(s) Oral every 6 hours PRN  LORazepam     Tablet 2 milliGRAM(s) Oral every 6 hours PRN  nicotine  Polacrilex Gum 2 milliGRAM(s) Oral every 2 hours PRN  nicotine -   7 mG/24Hr(s) Patch 1 Patch Transdermal daily  risperiDONE   Tablet 2 milliGRAM(s) Oral two times a day      T(C): 35.6 (05-29-20 @ 07:56), Max: 36.2 (05-29-20 @ 06:32)  HR: 80 (05-29-20 @ 07:56) (74 - 97)  BP: 127/80 (05-29-20 @ 07:56) (121/57 - 130/58)  RR: 16 (05-29-20 @ 07:56) (16 - 16)  SpO2: --    PE;  general:  no changes noted in nad    Lungs:    Heart:    EXT:    Neuro:  aelrt nod eficits      --  --  --  17  1.0  4.6  84                      CAPILLARY BLOOD GLUCOSE

## 2020-05-30 PROCEDURE — 99231 SBSQ HOSP IP/OBS SF/LOW 25: CPT

## 2020-05-30 RX ADMIN — RISPERIDONE 2 MILLIGRAM(S): 4 TABLET ORAL at 08:14

## 2020-05-30 RX ADMIN — RISPERIDONE 2 MILLIGRAM(S): 4 TABLET ORAL at 20:05

## 2020-05-30 NOTE — PROGRESS NOTE BEHAVIORAL HEALTH - NSBHFUPINTERVALHXFT_PSY_A_CORE
Pt seen and evaluated, chart reviewed. As per nursing report, pt had no acute events overnight, medication compliant and no behavioral issues. On evaluation, pt endorses improved mood, sleep and appetite. Pt states anger is improved, denies current HI, intent and plan. Endorses continued paranoia of being followed by the , encouraged and educated pt on responses other than anger and violence, pt verbalized acknowledgement. Pt remains in good behavioral control, states feels safe on the unit. Denies AVH. Pt denies medication side effects. Denies SI. Encouraged groups. Pt educated on risks and benefits of [facility], pt states will consider.

## 2020-05-31 PROCEDURE — 99231 SBSQ HOSP IP/OBS SF/LOW 25: CPT

## 2020-05-31 RX ADMIN — RISPERIDONE 2 MILLIGRAM(S): 4 TABLET ORAL at 09:08

## 2020-05-31 RX ADMIN — RISPERIDONE 2 MILLIGRAM(S): 4 TABLET ORAL at 20:07

## 2020-06-01 PROCEDURE — 99231 SBSQ HOSP IP/OBS SF/LOW 25: CPT

## 2020-06-01 RX ADMIN — RISPERIDONE 2 MILLIGRAM(S): 4 TABLET ORAL at 20:56

## 2020-06-01 RX ADMIN — Medication 50 MILLIGRAM(S): at 20:56

## 2020-06-01 RX ADMIN — RISPERIDONE 2 MILLIGRAM(S): 4 TABLET ORAL at 08:00

## 2020-06-01 NOTE — CHART NOTE - NSCHARTNOTEFT_GEN_A_CORE
Vicki remains on the unit for continued treatment, safety and observation. He met with treatment team to discuss his treatment plan and medications.  He engaged in interview with staff and reports feeling better but is still isolative on the unit. Vicki reports that he would like [facility] be [facility]C and asked  to look into shelter placement. SW provided education about current D/C plan that might be able to bypass the shelter as well as provide better support. Pt reports that anger is less and that he would really like to be D/C. SW stated that they would explore all options to ensure a safe D/C. Pt understood. SW and NP continued to address groups and isolative behavior. Pt reports that he would try to attend groups. SW left a message for [facility] to discuss D/C plan. As of this writing HRA application is currently under review. SW will continue to follow.         Mood – Good  Sleep - Good  Appetite - Good  ADLs - Good  Thought Process – Paranoid   Observation – [id number]    Vicki will continue to meet with  one on one and with treatment team daily.  At this time patient is not psychiatrically stable for discharge.

## 2020-06-01 NOTE — PROGRESS NOTE ADULT - SUBJECTIVE AND OBJECTIVE BOX
pt stable alert in NAD  no new complaints    PYSCHOSIS  ^PSYCH EVAL  Handoff  MEWS Score  No pertinent past medical history  Psychosis  Tobacco use disorder  Autism spectrum disorder  Mood disorder  No significant past surgical history  PSYCH EVAL  90+    HEALTH ISSUES - PROBLEM Dx:  Tobacco use disorder  Autism spectrum disorder  Mood disorder        PAST MEDICAL & SURGICAL HISTORY:  No pertinent past medical history  No significant past surgical history    No Known Allergies      FAMILY HISTORY:      haloperidol     Tablet 5 milliGRAM(s) Oral every 6 hours PRN  hydrOXYzine hydrochloride 50 milliGRAM(s) Oral every 6 hours PRN  LORazepam     Tablet 2 milliGRAM(s) Oral every 6 hours PRN  nicotine  Polacrilex Gum 2 milliGRAM(s) Oral every 2 hours PRN  nicotine -   7 mG/24Hr(s) Patch 1 Patch Transdermal daily  risperiDONE   Tablet 2 milliGRAM(s) Oral two times a day      T(C): 35.3 (06-01-20 @ 05:47), Max: 35.7 (05-31-20 @ 19:35)  HR: 50 (06-01-20 @ 05:47) (50 - 75)  BP: 99/54 (06-01-20 @ 05:47) (90/50 - 116/69)  RR: 20 (06-01-20 @ 05:47) (16 - 20)  SpO2: --    PE;  general:  no cahnges in stastus in nad     Lungs:    Heart:    EXT:    Neuro:    alert no defciits                        CAPILLARY BLOOD GLUCOSE

## 2020-06-01 NOTE — PROGRESS NOTE BEHAVIORAL HEALTH - NSBHFUPINTERVALHXFT_PSY_A_CORE
Pt seen and evaluated, chart reviewed. As per nursing report, pt had no acute events over the weekend, medication compliant and no behavioral issues. On evaluation, pt endorses improved mood, sleep and appetite. Pt states anger and paranoia is improved, denies current HI, intent and plan. Pt remains in good behavioral control, states feels safe on the unit. Denies AVH. Pt denies medication side effects. Denies SI. Encouraged groups. Pt educated on risks and benefits of [facility], pt currently refuses.

## 2020-06-02 PROCEDURE — 99231 SBSQ HOSP IP/OBS SF/LOW 25: CPT

## 2020-06-02 RX ADMIN — RISPERIDONE 2 MILLIGRAM(S): 4 TABLET ORAL at 08:01

## 2020-06-02 RX ADMIN — Medication 50 MILLIGRAM(S): at 20:19

## 2020-06-02 RX ADMIN — RISPERIDONE 2 MILLIGRAM(S): 4 TABLET ORAL at 20:18

## 2020-06-02 NOTE — DISCHARGE NOTE BEHAVIORAL HEALTH - NSBHDCCONDITIONFT_PSY_A_CORE
Patient is psychiatrically stable for discharge at this time. Patient does not present an imminent risk to self or others at this time.

## 2020-06-02 NOTE — DISCHARGE NOTE BEHAVIORAL HEALTH - NSBHDCSUBSTHXFT_PSY_A_CORE
Pt endorses smoking 1/2 pack of cigarettes daily for the last year. Endorses occasional drinking, however states "rare", last use 1 month ago. Endorses smoking marijuana one-time last month. Denies other illicit substances; denies heroin use despite chart review showing pt endorsing previous heroin use in ED.

## 2020-06-02 NOTE — PROGRESS NOTE BEHAVIORAL HEALTH - THOUGHT CONTENT
Delusions/Other
Other/Delusions
Unremarkable
Homicidality/Preoccupations/Ruminations
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
## 2020-06-02 NOTE — DISCHARGE NOTE BEHAVIORAL HEALTH - NSTOBACCOREFERRAL_GEN_A_NCS
Yes Patient declined information/Offered information about [place] [facility] program. He refuses. Says: "I don't want any nicotine replacement therapy or ."

## 2020-06-02 NOTE — PROGRESS NOTE BEHAVIORAL HEALTH - NSBHCHARTREVIEWVS_PSY_A_CORE FT
Vital Signs Last 24 Hrs  T(C): 35.6 (01 Jun 2020 08:20), Max: 35.7 (31 May 2020 19:35)  T(F): 96.1 (01 Jun 2020 08:20), Max: 96.3 (31 May 2020 19:35)  HR: 68 (01 Jun 2020 08:20) (50 - 72)  BP: 110/67 (01 Jun 2020 08:20) (90/50 - 110/67)  BP(mean): --  RR: 16 (01 Jun 2020 08:20) (16 - 20)  SpO2: --
Vital Signs Last 24 Hrs  T(C): 35.6 (29 May 2020 07:56), Max: 36.2 (29 May 2020 06:32)  T(F): 96.1 (29 May 2020 07:56), Max: 97.1 (29 May 2020 06:32)  HR: 80 (29 May 2020 07:56) (74 - 97)  BP: 127/80 (29 May 2020 07:56) (121/57 - 130/58)  BP(mean): --  RR: 16 (29 May 2020 07:56) (16 - 16)  SpO2: --
Vital Signs Last 24 Hrs  T(C): 35.6 (31 May 2020 08:20), Max: 36.5 (31 May 2020 05:57)  T(F): 96 (31 May 2020 08:20), Max: 97.7 (31 May 2020 05:57)  HR: 75 (31 May 2020 08:20) (58 - 91)  BP: 116/69 (31 May 2020 08:20) (116/69 - 135/86)  BP(mean): --  RR: 16 (31 May 2020 08:20) (16 - 18)  SpO2: --
Vital Signs Last 24 Hrs  T(C): 35.8 (28 May 2020 07:53), Max: 35.9 (27 May 2020 15:42)  T(F): 96.4 (28 May 2020 07:53), Max: 96.6 (27 May 2020 15:42)  HR: 84 (28 May 2020 07:53) (54 - 92)  BP: 134/75 (28 May 2020 07:53) (99/62 - 134/75)  BP(mean): --  RR: 18 (28 May 2020 07:53) (16 - 18)  SpO2: --
Vital Signs Last 24 Hrs  T(C): 36.2 (02 Jun 2020 08:04), Max: 37.1 (01 Jun 2020 17:48)  T(F): 97.1 (02 Jun 2020 08:04), Max: 98.7 (01 Jun 2020 17:48)  HR: 68 (02 Jun 2020 08:04) (65 - 85)  BP: 105/64 (02 Jun 2020 08:04) (101/60 - 161/68)  BP(mean): --  RR: 16 (02 Jun 2020 08:04) (16 - 18)  SpO2: --
Vital Signs Last 24 Hrs  T(C): 36.2 (30 May 2020 15:54), Max: 36.2 (30 May 2020 08:13)  T(F): 97.2 (30 May 2020 15:54), Max: 97.2 (30 May 2020 15:54)  HR: 91 (30 May 2020 15:54) (67 - 91)  BP: 135/86 (30 May 2020 15:54) (104/50 - 135/86)  BP(mean): --  RR: 18 (30 May 2020 15:54) (16 - 18)  SpO2: --
Vital Signs Last 24 Hrs  T(C): 36.5 (27 May 2020 08:49), Max: 36.5 (27 May 2020 08:49)  T(F): 97.7 (27 May 2020 08:49), Max: 97.7 (27 May 2020 08:49)  HR: 18 (27 May 2020 08:49) (16 - 83)  BP: 114/63 (27 May 2020 08:49) (114/63 - 121/59)  BP(mean): --  RR: 18 (27 May 2020 08:49) (16 - 18)  SpO2: --
Vital Signs Last 24 Hrs  T(C): 35.6 (26 May 2020 05:29), Max: 37.1 (26 May 2020 02:41)  T(F): 96.1 (26 May 2020 05:29), Max: 98.7 (26 May 2020 02:41)  HR: 67 (26 May 2020 05:29) (67 - 106)  BP: 131/67 (26 May 2020 05:29) (101/63 - 131/67)  BP(mean): --  RR: 18 (26 May 2020 05:29) (17 - 18)  SpO2: 100% (26 May 2020 04:00) (100% - 100%)

## 2020-06-02 NOTE — DISCHARGE NOTE BEHAVIORAL HEALTH - NSBHDCTHERAPYFT_PSY_A_CORE
Patient was provided with milieu therapy as well as daily supportive psychotherapy. Patient has attended group related to coping skills and crisis skill group. Reinforced positive coping skills learned on the unit, STOP skill and meditation.

## 2020-06-02 NOTE — PROGRESS NOTE BEHAVIORAL HEALTH - SECONDARY DX1
Tobacco use disorder

## 2020-06-02 NOTE — DISCHARGE NOTE BEHAVIORAL HEALTH - NSBHDCRESPONSEFT_PSY_A_CORE
Pt has made significant progress over the course of hospitalization. With continuous psychotherapy from the treatment team and the medications, patient reports feeling better, sleeping and eating well with improved anger control and paranoia. Thought process and insight improved. Pt was calm and cooperative and was in good behavioral control throughout admission. Patient denied any suicidal or homicidal ideations. Patient states if he believes he is being followed by the , he plans to get away and think about the situation, encouraged STOP skill. Patient denied any auditory or visual hallucinations. Pt was evaluated by treatment team, pt is stable for discharge and patient shows no imminent danger to self, others or property at this time. Pt understands and agrees with treatment plan and following up with outpatient. Psychoeducation provided regarding diagnosis, medications, treatment and follow up. Risks, benefits, alternatives discussed, all questions and concerns addressed and answered.

## 2020-06-02 NOTE — DISCHARGE NOTE BEHAVIORAL HEALTH - NSBHDCADDFT_PSY_A_CORE
Treatment team started HRA application to [facility] for residential program placement. Pt agreed, however requested for discharge to his cousin's house while HRA application was being reviewed. Pt states he plans to follow-up with HRA application after discharge. Treatment team started HRA application to [facility] for residential program placement. Pt agreed, however requested for discharge to his cousin's house while HRA application was being reviewed. Pt states he plans to follow-up with HRA application after discharge.   Although pt currently refuses [facility], pt would most likely benefit. Encourage next provider to continue to recommend [facility] to pt.
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
## 2020-06-02 NOTE — DISCHARGE NOTE BEHAVIORAL HEALTH - NSBHDCVIOLPROTECTFT_PSY_A_CORE
Supportive cousin, medication compliance, future orientation, ability to state reasons for living, ability to state reasons to avoid violence, able to verbalize consequences to violence and motivation to avoid violent episodes, learned coping skills (ie, STOP skill), good insight, willingness to seek care in moment of crisis

## 2020-06-02 NOTE — DISCHARGE NOTE BEHAVIORAL HEALTH - MEDICATION SUMMARY - MEDICATIONS TO TAKE
I will START or STAY ON the medications listed below when I get home from the hospital:    risperiDONE 2 mg oral tablet  -- 1 tab(s) by mouth 2 times a day x 30 days Continue to take as prescribed until told otherwise by your provider  -- Indication: For ASD    hydrOXYzine hydrochloride 50 mg oral tablet  -- 1 tab(s) by mouth once a day (at bedtime) x 7 days, As Needed -anxiety/insomnia Continue to take until told otherwise by your provider   -- Indication: For Insomnia    nicotine 7 mg/24 hr transdermal film, extended release  -- 1 patch by transdermal patch once a day  -- Indication: For Smoking cessation

## 2020-06-02 NOTE — PROGRESS NOTE BEHAVIORAL HEALTH - NSBHADMITIPREASON_PSY_A_CORE
Danger to others; mental illness expected to respond to inpatient care

## 2020-06-02 NOTE — PROGRESS NOTE BEHAVIORAL HEALTH - NSBHADMITIPOBSFT_PSY_A_CORE
As per unit protocol

## 2020-06-02 NOTE — DISCHARGE NOTE BEHAVIORAL HEALTH - NSBHDCVIOLFCTRMIT_PSY_A_CORE
Patient can rely on his cousin for social support. Patient has been medication compliant, not endorsing any side effects. Patient verbalizing reasons for living and reasons to avoid future acts of violence. Patient with improved insight into events that led up to hospitalization, states if he believes he is being followed, he will get away and think about the situation. Patient endorses motivation to avoid future violent altercations, able to verbalize consequences of violence. Patient to use positive coping skills learned during the course of the inpatient hospitalization (ie, STOP skill, deep breathing). Patient verbalized willingness to seek care in moment of crisis. Patient is agreeable that should he have any thoughts of hurting himself or others, he will call 911, return to the ED.

## 2020-06-02 NOTE — PROGRESS NOTE BEHAVIORAL HEALTH - PRIMARY DX
Autism spectrum disorder
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
## 2020-06-02 NOTE — PROGRESS NOTE BEHAVIORAL HEALTH - NSBHATTESTSEENBY_PSY_A_CORE
NP without Attending Psychiatrist
attending Psychiatrist without NP/Trainee
NP without Attending Psychiatrist
NP without Attending Psychiatrist
attending Psychiatrist without NP/Trainee
NP without Attending Psychiatrist

## 2020-06-02 NOTE — DISCHARGE NOTE BEHAVIORAL HEALTH - NSBHDCADMRISKMITFT_PSY_A_CORE
Patient can rely on his family for social support. Patient able to verbalize benefits of taking and staying on risperdal, states medication helps control his anger along with learned coping skills (ie, STOP skill).

## 2020-06-02 NOTE — DISCHARGE NOTE BEHAVIORAL HEALTH - NSBHDCRESOURCESOTHERFT_PSY_A_CORE
To follow up with Housing Application please contact:  SIMONE   [address], [facility] [place] [address]  Phone: [phone] | Fax: [phone]    Please also follow up with [facility] @    [facility]  [address] [place] [address], [address] [place] [address]  Tel:       [phone]

## 2020-06-02 NOTE — DISCHARGE NOTE BEHAVIORAL HEALTH - NSBHDCAGENCY2FT_PSY_A_CORE
[facility] Dual Focus Outpatient Program [facility]patient [facility]

## 2020-06-02 NOTE — PROGRESS NOTE BEHAVIORAL HEALTH - NSBHADMITIPOBS_PSY_A_CORE
Enhanced supervision

## 2020-06-02 NOTE — PROGRESS NOTE BEHAVIORAL HEALTH - SUMMARY
18 y/o male, undomiciled, unemployed, with self-reported PPHx: PPD and ASD, legal hx of 3 prior arrests d/t assault (one prior arrest d/t owning a gun, pt currently denies gun access), no known hospitalizations, no SA/SIB, PMH: none, who self-presents to ED with complaint of HI d/t recent trigger which pt currently refuses to talk about. Collateral from pt's previous therapist confirms pt's PPD/ASD diagnosis, as well as previous assault charges. Anger control problems most likely in context of ASD. On evaluation, pt presents cooperative and in good behavioral control, states he has anger control problems that had been well-controlled on risperdal in the past, requesting restarting risperdal during this hospitalization. On evaluation, pt presents in good behavioral control with improved mood, sleep, appetite and anger control. Pt denies HI, intent and plan. Dispo options discussed.     #ASD  -c/w risperdal 1 mg bid  -c/w hydroxyzine 50 mg q6h prn for anxiety or/and insomnia  -c/w haldol 5 mg q6h prn for agitation  -c/w ativan 2 mg q6h prn for agitation    #Tobacco use d/o  -c/w nicotine patch daily  -c/w nicotine gum q2h prn for smoking cessation
20 y/o male, undomiciled, unemployed, with self-reported PPHx: PPD and ASD, legal hx of 3 prior arrests d/t assault (one prior arrest d/t owning a gun, pt currently denies gun access), no known hospitalizations, no SA/SIB, PMH: none, who self-presents to ED with complaint of HI d/t recent trigger which pt currently refuses to talk about. Collateral from pt's previous therapist confirms pt's PPD/ASD diagnosis, as well as previous assault charges. Anger control problems most likely in context of ASD. On evaluation, pt presents cooperative and in good behavioral control, states he has anger control problems that had been well-controlled on risperdal in the past, requesting restarting risperdal during this hospitalization. On evaluation, pt presents in good behavioral control with improved mood, sleep, appetite and anger control. Pt denies HI, intent and plan. Pt agreed to discuss details of event leading to HI prior to hospitalization- pt endorses paranoia, states he is being followed by the  which upsets him, states if they continue to follow him he will "deal with them" while making a motion of his fist hitting his hand. Delusional d/o, persecutory type added to differential.    #ASD vs delusional d/o, persecutory type  -c/w risperdal 2 mg bid  -educated pt on the risks and benefits of CARLSON, pt states will consider  -c/w hydroxyzine 50 mg q6h prn for anxiety or/and insomnia  -c/w haldol 5 mg q6h prn for agitation  -c/w ativan 2 mg q6h prn for agitation    #Tobacco use d/o  -c/w nicotine patch daily  -c/w nicotine gum q2h prn for smoking cessation    Dispo: [facility] residential program
18 y/o male, undomiciled, unemployed, with self-reported PPHx: PPD and ASD, legal hx of 3 prior arrests d/t assault (one prior arrest d/t owning a gun, pt currently denies gun access), no known hospitalizations, no SA/SIB, PMH: none, who self-presents to ED with complaint of HI d/t recent trigger which pt currently refuses to talk about. Collateral from pt's previous therapist confirms pt's PPD/ASD diagnosis, as well as previous assault charges. Anger control problems most likely in context of ASD. On evaluation, pt presents cooperative and in good behavioral control, states he has anger control problems that had been well-controlled on risperdal in the past, requesting restarting risperdal during this hospitalization. Of note, pt endorsed paranoia, states he is being followed by the  which upsets him, states if they continue to follow him he will "deal with them" while making a motion of his fist hitting his hand. Delusional d/o, persecutory type added to differential. On evaluation, pt presents in good behavioral control with improved mood, sleep, appetite, anger control and paranoia. Pt denies HI, intent and plan. Pt currently refuses CARLSON.     #ASD vs delusional d/o, persecutory type  -c/w risperdal 2 mg bid  -educated pt on the risks and benefits of CARLSON, pt currently refuses  -c/w hydroxyzine 50 mg q6h prn for anxiety or/and insomnia  -c/w haldol 5 mg q6h prn for agitation  -c/w ativan 2 mg q6h prn for agitation    #Tobacco use d/o  -c/w nicotine patch daily  -c/w nicotine gum q2h prn for smoking cessation    Dispo: [facility] residential program
18 y/o male, undomiciled, unemployed, with self-reported PPHx: PPD and ASD, legal hx of 3 prior arrests d/t assault (one prior arrest d/t owning a gun, pt currently denies gun access), no known hospitalizations, no SA/SIB, PMH: none, who self-presents to ED with complaint of HI d/t recent trigger which pt currently refuses to talk about. Collateral from pt's previous therapist confirms pt's PPD/ASD diagnosis, as well as previous assault charges. Anger control problems most likely in context of ASD. On evaluation, pt presents cooperative and in good behavioral control, states he has anger control problems that had been well-controlled on risperdal in the past, requesting restarting risperdal during this hospitalization. On evaluation, pt presents in good behavioral control with improved mood, sleep, appetite and anger control. Pt denies HI, intent and plan. Pt agreed to discuss details of event leading to HI prior to hospitalization- pt endorses paranoia, states he is being followed by the  which upsets him, states if they continue to follow him he will "deal with them" while making a motion of his fist hitting his hand. Delusional d/o, persecutory type added to differential.    #ASD vs delusional d/o, persecutory type  -c/w risperdal 2 mg bid  -educated pt on the risks and benefits of CARLSON, pt states will consider  -c/w hydroxyzine 50 mg q6h prn for anxiety or/and insomnia  -c/w haldol 5 mg q6h prn for agitation  -c/w ativan 2 mg q6h prn for agitation    #Tobacco use d/o  -c/w nicotine patch daily  -c/w nicotine gum q2h prn for smoking cessation    Dispo: [facility] residential program
20 y/o male, undomiciled, unemployed, with self-reported PPHx: PPD and ASD, legal hx of 3 prior arrests d/t assault (one prior arrest d/t owning a gun, pt currently denies gun access), no known hospitalizations, no SA/SIB, PMH: none, who self-presents to ED with complaint of HI d/t recent trigger which pt currently refuses to talk about. Collateral from pt's previous therapist confirms pt's PPD/ASD diagnosis, as well as previous assault charges. Anger control problems most likely in context of ASD. On evaluation, pt presents cooperative and in good behavioral control, states he has anger control problems that had been well-controlled on risperdal in the past, requesting restarting risperdal during this hospitalization. Of note, pt endorsed paranoia, states he is being followed by the  which upsets him, states if they continue to follow him he will "deal with them" while making a motion of his fist hitting his hand. Delusional d/o, persecutory type added to differential. On evaluation, pt presents in good behavioral control with improved mood, sleep, appetite, anger control and paranoia. Pt states if he feels like he is being followed, instead of resorting to violence, he plans to walk away. Pt denies HI, intent and plan. Pt currently refuses CARLSON.     #ASD vs delusional d/o, persecutory type  -c/w risperdal 2 mg bid  -educated pt on the risks and benefits of CARLSON, pt currently refuses  -c/w hydroxyzine 50 mg q6h prn for anxiety or/and insomnia  -c/w haldol 5 mg q6h prn for agitation  -c/w ativan 2 mg q6h prn for agitation    #Tobacco use d/o  -c/w nicotine patch daily  -c/w nicotine gum q2h prn for smoking cessation    Dispo: pt refuses [facility] residential program --> home with his cousin, Michael Melchor
20 y/o male, undomiciled, unemployed, with self-reported PPHx: PPD and ASD, legal hx of 3 prior arrests d/t assault (one prior arrest d/t owning a gun, pt currently denies gun access), no known hospitalizations, no SA/SIB, PMH: none, who self-presents to ED with complaint of HI d/t recent trigger which pt currently refuses to talk about. Collateral from pt's previous therapist confirms pt's PPD/ASD diagnosis, as well as previous assault charges. Anger control problems most likely in context of ASD. On evaluation, pt presents cooperative and in good behavioral control, states he has anger control problems that had been well-controlled on risperdal in the past, requesting restarting risperdal during this hospitalization. On evaluation, pt presents in good behavioral control with improved mood, sleep, appetite and anger control. Pt denies HI, intent and plan. Pt agreed to discuss details of event leading to HI prior to hospitalization- pt endorses paranoia, states he is being followed by the  which upsets him, states if they continue to follow him he will "deal with them" while making a motion of his fist hitting his hand. Delusional d/o, persecutory type added to differential.    #ASD vs delusional d/o, persecutory type  -c/w risperdal 2 mg bid  -educated pt on the risks and benefits of CARLSON, pt states will consider  -c/w hydroxyzine 50 mg q6h prn for anxiety or/and insomnia  -c/w haldol 5 mg q6h prn for agitation  -c/w ativan 2 mg q6h prn for agitation    #Tobacco use d/o  -c/w nicotine patch daily  -c/w nicotine gum q2h prn for smoking cessation    Dispo: [facility] residential program
20 y/o male, undomiciled, unemployed, with self-reported PPHx: PPD and ASD, legal hx of 3 prior arrests d/t assault (one prior arrest d/t owning a gun, pt currently denies gun access), no known hospitalizations, no SA/SIB, PMH: none, who self-presents to ED with complaint of HI d/t recent trigger which pt currently refuses to talk about. Collateral from pt's previous therapist confirms pt's PPD/ASD diagnosis, as well as previous assault charges. Anger control problems most likely in context of ASD. On evaluation, pt presents cooperative and in good behavioral control, states he has anger control problems that had been well-controlled on risperdal in the past, requesting restarting risperdal during this hospitalization. On evaluation, pt presents in good behavioral control with improved mood, sleep, appetite and anger control. Pt denies HI, intent and plan. Pt agreed to discuss details of event leading to HI prior to hospitalization- pt endorses paranoia, states he is being followed by the  which upsets him, states if they continue to follow him he will "deal with them" while making a motion of his fist hitting his hand. Delusional d/o, persecutory type added to differential.    #ASD vs delusional d/o, persecutory type  -titrate risperdal 2 mg bid  -c/w hydroxyzine 50 mg q6h prn for anxiety or/and insomnia  -c/w haldol 5 mg q6h prn for agitation  -c/w ativan 2 mg q6h prn for agitation    #Tobacco use d/o  -c/w nicotine patch daily  -c/w nicotine gum q2h prn for smoking cessation
18 y/o male, undomiciled, unemployed, with self-reported PPHx: PPD and ASD, legal hx of 3 prior arrests d/t assault (one prior arrest d/t owning a gun, pt currently denies gun access), no known hospitalizations, no SA/SIB, PMH: none, who self-presents to ED with complaint of HI d/t recent trigger which pt currently refuses to talk about. Collateral from pt's previous therapist confirms pt's PPD/ASD diagnosis, as well as previous assault charges. Anger control problems most likely in context of ASD. On evaluation, pt presents cooperative and in good behavioral control, states he has anger control problems that had been well-controlled on risperdal in the past, requesting restarting risperdal during this hospitalization. Pt will likely benefit from IPP for medication management at this time.    #ASD  -c/w risperdal 1 mg bid  -start hydroxyzine 50 mg q6h prn for anxiety or/and insomnia  -c/w haldol 5 mg q6h prn for agitation  -c/w ativan 2 mg q6h prn for agitation    #Tobacco use d/o  -c/w nicotine patch daily

## 2020-06-02 NOTE — PROGRESS NOTE BEHAVIORAL HEALTH - NSBHCHARTREVIEWINVESTIGATE_PSY_A_CORE FT
< from: 12 Lead ECG (05.25.20 @ 23:33) >      Ventricular Rate 74 BPM    Atrial Rate 74 BPM    P-R Interval 132 ms    QRS Duration 86 ms    Q-T Interval 350 ms    QTC Calculation(Bezet) 388 ms    P Axis 77 degrees    R Axis 84 degrees    T Axis 75 degrees    Diagnosis Line Normal sinus rhythm  Normal ECG    < end of copied text >

## 2020-06-02 NOTE — DISCHARGE NOTE BEHAVIORAL HEALTH - NSBHDCAGENCY1FT_PSY_A_CORE
[facility] Dual Focus Outpatient Program [facility]patient [facility]

## 2020-06-02 NOTE — PROGRESS NOTE BEHAVIORAL HEALTH - NSBHCHARTREVIEWLAB_PSY_A_CORE FT
Complete Blood Count + Automated Diff (05.25.20 @ 22:56)    WBC Count: 5.38 K/uL    RBC Count: 5.05 M/uL    Hemoglobin: 14.7 g/dL    Hematocrit: 45.1 %    Mean Cell Volume: 89.3 fL    Mean Cell Hemoglobin: 29.1 pg    Mean Cell Hemoglobin Conc: 32.6 g/dL    Red Cell Distrib Width: 12.2 %    Platelet Count - Automated: 207 K/uL    Auto Neutrophil #: 2.79 K/uL    Auto Lymphocyte #: 1.73 K/uL    Auto Monocyte #: 0.51 K/uL    Auto Eosinophil #: 0.31 K/uL    Auto Basophil #: 0.03 K/uL    Auto Neutrophil %: 51.7    Auto Lymphocyte %: 32.2 %    Auto Monocyte %: 9.5 %    Auto Eosinophil %: 5.8 %    Auto Basophil %: 0.6 %    Auto Immature Granulocyte %: 0.2 %    Nucleated RBC: 0 /100 WBCs  Basic Metabolic Panel (05.25.20 @ 22:56)    Sodium, Serum: 137 mmol/L    Potassium, Serum: 4.1 mmol/L    Chloride, Serum: 104 mmol/L    Carbon Dioxide, Serum: 25 mmol/L    Anion Gap, Serum: 8 mmol/L    Blood Urea Nitrogen, Serum: 20 mg/dL    Creatinine, Serum: 0.9 mg/dL    Glucose, Serum: 106 mg/dL    Calcium, Total Serum: 9.4 mg/dL    eGFR if Non : 123    eGFR if : 143 mL/min/1.73M2  Lipid Profile in AM (05.27.20 @ 06:57)    Total Cholesterol/HDL Ratio Measurement: 3.1 Ratio    Cholesterol, Serum: 144 mg/dL    Triglycerides, Serum: 70 mg/dL    HDL Cholesterol, Serum: 46    Direct LDL: 89
Complete Blood Count + Automated Diff (05.25.20 @ 22:56)    WBC Count: 5.38 K/uL    RBC Count: 5.05 M/uL    Hemoglobin: 14.7 g/dL    Hematocrit: 45.1 %    Mean Cell Volume: 89.3 fL    Mean Cell Hemoglobin: 29.1 pg    Mean Cell Hemoglobin Conc: 32.6 g/dL    Red Cell Distrib Width: 12.2 %    Platelet Count - Automated: 207 K/uL    Auto Neutrophil #: 2.79 K/uL    Auto Lymphocyte #: 1.73 K/uL    Auto Monocyte #: 0.51 K/uL    Auto Eosinophil #: 0.31 K/uL    Auto Basophil #: 0.03 K/uL    Auto Neutrophil %: 51.7    Auto Lymphocyte %: 32.2 %    Auto Monocyte %: 9.5 %    Auto Eosinophil %: 5.8 %    Auto Basophil %: 0.6 %    Auto Immature Granulocyte %: 0.2 %    Nucleated RBC: 0 /100 WBCs  Basic Metabolic Panel (05.25.20 @ 22:56)    Sodium, Serum: 137 mmol/L    Potassium, Serum: 4.1 mmol/L    Chloride, Serum: 104 mmol/L    Carbon Dioxide, Serum: 25 mmol/L    Anion Gap, Serum: 8 mmol/L    Blood Urea Nitrogen, Serum: 20 mg/dL    Creatinine, Serum: 0.9 mg/dL    Glucose, Serum: 106 mg/dL    Calcium, Total Serum: 9.4 mg/dL    eGFR if Non : 123    eGFR if : 143 mL/min/1.73M2  Lipid Profile in AM (05.27.20 @ 06:57)    Total Cholesterol/HDL Ratio Measurement: 3.1 Ratio    Cholesterol, Serum: 144 mg/dL    Triglycerides, Serum: 70 mg/dL    HDL Cholesterol, Serum: 46    Direct LDL: 89  Thyroid Stimulating Hormone, Serum in AM (05.27.20 @ 06:57)    Thyroid Stimulating Hormone, Serum: 0.19 uIU/mL
Complete Blood Count + Automated Diff (05.25.20 @ 22:56)    WBC Count: 5.38 K/uL    RBC Count: 5.05 M/uL    Hemoglobin: 14.7 g/dL    Hematocrit: 45.1 %    Mean Cell Volume: 89.3 fL    Mean Cell Hemoglobin: 29.1 pg    Mean Cell Hemoglobin Conc: 32.6 g/dL    Red Cell Distrib Width: 12.2 %    Platelet Count - Automated: 207 K/uL    Auto Neutrophil #: 2.79 K/uL    Auto Lymphocyte #: 1.73 K/uL    Auto Monocyte #: 0.51 K/uL    Auto Eosinophil #: 0.31 K/uL    Auto Basophil #: 0.03 K/uL    Auto Neutrophil %: 51.7    Auto Lymphocyte %: 32.2 %    Auto Monocyte %: 9.5 %    Auto Eosinophil %: 5.8 %    Auto Basophil %: 0.6 %    Auto Immature Granulocyte %: 0.2 %    Nucleated RBC: 0 /100 WBCs  Basic Metabolic Panel (05.25.20 @ 22:56)    Sodium, Serum: 137 mmol/L    Potassium, Serum: 4.1 mmol/L    Chloride, Serum: 104 mmol/L    Carbon Dioxide, Serum: 25 mmol/L    Anion Gap, Serum: 8 mmol/L    Blood Urea Nitrogen, Serum: 20 mg/dL    Creatinine, Serum: 0.9 mg/dL    Glucose, Serum: 106 mg/dL    Calcium, Total Serum: 9.4 mg/dL    eGFR if Non : 123    eGFR if : 143 mL/min/1.73M2

## 2020-06-02 NOTE — DISCHARGE NOTE BEHAVIORAL HEALTH - PAST PSYCHIATRIC HISTORY
PPD/ASD, legal hx of 3 prior arrests d/t assault (one prior arrest d/t owning a gun, pt currently denies gun access), no known hospitalizations, no SA/SIB

## 2020-06-02 NOTE — DISCHARGE NOTE BEHAVIORAL HEALTH - NSBHDCMEDSFT_PSY_A_CORE
Pt started on risperdal 1 mg bid, titrated to risperdal 2 mg bid (5/28). Pt started on hydroxyzine 50 mg q6h prn for anxiety or/and insomnia. Pt tolerated the medications well, denied side effects. Pt offered CARLSON (Risperdal Consta/Invega Sustenna), educated on the risks and benefits of CARLSON, pt refused CARLSON at this time.

## 2020-06-02 NOTE — PROGRESS NOTE BEHAVIORAL HEALTH - RISK ASSESSMENT
Risk factors include trauma hx, previous assault charges, impulsivity and poor anger management, residential and financial instability. Protective factors include motivation for change, insight on anger control problems, willingness to utilize crisis/emergency services in times of crisis.

## 2020-06-02 NOTE — DISCHARGE NOTE BEHAVIORAL HEALTH - NSBHDCPURPOSE1FT_PSY_A_CORE
Outpatient Mental Health Treatment. Outpatient Mental Health Treatment. with Fredy (IN PERSON SESSION)

## 2020-06-02 NOTE — PROGRESS NOTE BEHAVIORAL HEALTH - NSBHFUPINTERVALHXFT_PSY_A_CORE
Pt seen and evaluated, chart reviewed. As per nursing report, pt had no acute events over night, took hydroxyzine prn for sleep, medication compliant. On evaluation, pt endorses improved mood, sleep and appetite. Pt states anger and paranoia is improved, denies current HI, intent and plan. Pt states if he feels like someone is following him, he will "just get away", states he will not react with violence. Pt remains in good behavioral control, states feels safe on the unit. Denies AVH. Pt denies medication side effects. Denies SI. Encouraged groups. Pt educated on risks and benefits of CARLSON, pt currently refuses.    Pt provided personal collateral, his cousin, Michael Melchor ([phone]) - he states he is willing to take pt in and have him live in his home. States he is aware of pt's ASD and anger issues, states risperdal has helped in the past. He states he will help pt stay on top of his medication since pt is currently refusing CARLSON. He states he spoke with pt yesterday, pt sounds at baseline and has no concerns with discharge for tomorrow, will  pt.

## 2020-06-02 NOTE — PROGRESS NOTE BEHAVIORAL HEALTH - NSBHADMITDANGEROTHERS_PSY_A_CORE
high risk for assault

## 2020-06-02 NOTE — DISCHARGE NOTE BEHAVIORAL HEALTH - NSBHDCVIOLSAFETYFT_PSY_A_CORE
Safety planning was addressed during the course of the hospitalization. Patient actively participated in safety planning and was given a copy. Patient was able to identify situation in which to use it.

## 2020-06-02 NOTE — DISCHARGE NOTE BEHAVIORAL HEALTH - NSBHDCADDR2FT_A_CORE
[address] [facility] [address] [address] [place] [id number]

## 2020-06-03 VITALS
RESPIRATION RATE: 16 BRPM | HEART RATE: 99 BPM | TEMPERATURE: 98 F | SYSTOLIC BLOOD PRESSURE: 143 MMHG | DIASTOLIC BLOOD PRESSURE: 68 MMHG

## 2020-06-03 PROCEDURE — 99238 HOSP IP/OBS DSCHRG MGMT 30/<: CPT

## 2020-06-03 RX ORDER — HYDROXYZINE HCL 10 MG
1 TABLET ORAL
Qty: 7 | Refills: 0
Start: 2020-06-03 | End: 2020-06-09

## 2020-06-03 RX ORDER — RISPERIDONE 4 MG/1
1 TABLET ORAL
Qty: 60 | Refills: 0
Start: 2020-06-03 | End: 2020-07-02

## 2020-06-03 RX ORDER — NICOTINE POLACRILEX 2 MG
1 GUM BUCCAL
Qty: 0 | Refills: 0 | DISCHARGE
Start: 2020-06-03

## 2020-06-03 RX ADMIN — RISPERIDONE 2 MILLIGRAM(S): 4 TABLET ORAL at 08:07

## 2020-06-03 NOTE — PROGRESS NOTE ADULT - SUBJECTIVE AND OBJECTIVE BOX
pt stable alert in NAD  no new complaints    PYSCHOSIS  ^PSYCH EVAL  Handoff  MEWS Score  No pertinent past medical history  Autism spectrum disorder  Psychosis  Tobacco use disorder  Autism spectrum disorder  Mood disorder  No significant past surgical history  PSYCH EVAL  90+  Tobacco use disorder    HEALTH ISSUES - PROBLEM Dx:  Tobacco use disorder  Autism spectrum disorder  Mood disorder        PAST MEDICAL & SURGICAL HISTORY:  No pertinent past medical history  No significant past surgical history    No Known Allergies      FAMILY HISTORY:      haloperidol     Tablet 5 milliGRAM(s) Oral every 6 hours PRN  hydrOXYzine hydrochloride 50 milliGRAM(s) Oral every 6 hours PRN  nicotine  Polacrilex Gum 2 milliGRAM(s) Oral every 2 hours PRN  nicotine -   7 mG/24Hr(s) Patch 1 Patch Transdermal daily  risperiDONE   Tablet 2 milliGRAM(s) Oral two times a day      T(C): 35.6 (06-03-20 @ 05:48), Max: 36.7 (06-02-20 @ 15:44)  HR: 65 (06-03-20 @ 05:48) (65 - 93)  BP: 117/67 (06-03-20 @ 05:48) (105/64 - 121/70)  RR: 18 (06-03-20 @ 05:48) (16 - 18)  SpO2: --    PE;  general: no acute cahnges noted stsable in nad    Lungs:    Heart:    EXT:    Neuro:  alert no deficits                          CAPILLARY BLOOD GLUCOSE

## 2020-06-03 NOTE — ED ADULT NURSE NOTE - MODE OF DISCHARGE
Subjective   Tanmay Eden is a 9 y.o. male.     History of Present Illness     The following portions of the patient's history were reviewed and updated as appropriate: allergies, current medications, past family history, past medical history, past social history, past surgical history and problem list.    Well Child Assessment:  History was provided by the mother.   Nutrition  Types of intake include cereals, cow's milk, fish, eggs, juices and meats.   Dental  The patient has a dental home. The patient brushes teeth regularly. The patient flosses regularly. Last dental exam was less than 6 months ago.   Elimination  Elimination problems do not include constipation, diarrhea or urinary symptoms.   Safety  There is no smoking in the home. Home has working smoke alarms? yes. Home has working carbon monoxide alarms? yes. There is no gun in home.   Screening  Immunizations are up-to-date. There are no risk factors for hearing loss. There are no risk factors for anemia. There are no risk factors for dyslipidemia. There are no risk factors for tuberculosis.     Development: Age-appropriate    No other active concerns at this time.    Review of Systems   Gastrointestinal: Negative for constipation and diarrhea.   All other systems reviewed and are negative.      Objective   Physical Exam   Constitutional: He appears well-developed.   HENT:   Head: Atraumatic.   Right Ear: Tympanic membrane normal.   Left Ear: Tympanic membrane normal.   Nose: Nose normal.   Mouth/Throat: Mucous membranes are moist. Dentition is normal. Oropharynx is clear.   Eyes: Pupils are equal, round, and reactive to light. Conjunctivae and EOM are normal.   Neck: Normal range of motion. Neck supple.   Pulmonary/Chest: Effort normal and breath sounds normal. There is normal air entry.   Abdominal: Soft. Bowel sounds are normal.   Neurological: He is alert.   Skin: Skin is warm and moist. Capillary refill takes less than 2 seconds.   Nursing note  and vitals reviewed.        Assessment/Plan   Tanmay was seen today for well child.    Diagnoses and all orders for this visit:    Encounter for routine child health examination without abnormal findings    Attention-deficit hyperactivity disorder, predominantly hyperactive type    Anticipatory guidance:  Growth and development doing well.  On nutrition age-appropriate.  Bike helmet safety discussed.              Ambulatory

## 2020-06-03 NOTE — CHART NOTE - NSCHARTNOTEFT_GEN_A_CORE
SW Discharge Note:    Pt is set to be D/C today. Arrangements have been made for pt to go to his cousin's house.  Michael Melchor [address] [facility] [address]-  [phone]    Pt has the following appointments set:  [facility]  6/10/2020 @ 12:30pm  [address] [facility] [address]  First session is in-person with Fredy      [facility]  6/18/2020 @ 10:00am  [address] [facility] [address]  Medication Management with Dr. Chun

## 2020-06-03 NOTE — PROGRESS NOTE ADULT - PROBLEM SELECTOR PLAN 1
continue present treatment as per psych plan as reviewed  Medically stable with no new changes in treatment  will continue to monitor medical status while being treated on psych  medically stable for d./c as per psych

## 2020-06-05 DIAGNOSIS — R45.850 HOMICIDAL IDEATIONS: ICD-10-CM

## 2020-06-05 DIAGNOSIS — F29 UNSPECIFIED PSYCHOSIS NOT DUE TO A SUBSTANCE OR KNOWN PHYSIOLOGICAL CONDITION: ICD-10-CM

## 2020-06-05 DIAGNOSIS — F84.0 AUTISTIC DISORDER: ICD-10-CM

## 2020-06-05 DIAGNOSIS — F17.210 NICOTINE DEPENDENCE, CIGARETTES, UNCOMPLICATED: ICD-10-CM

## 2020-06-05 DIAGNOSIS — Z59.0 HOMELESSNESS: ICD-10-CM

## 2020-06-05 DIAGNOSIS — Z87.898 PERSONAL HISTORY OF OTHER SPECIFIED CONDITIONS: ICD-10-CM

## 2020-06-05 SDOH — ECONOMIC STABILITY - HOUSING INSECURITY: HOMELESSNESS: Z59.0

## 2020-06-10 ENCOUNTER — EMERGENCY (EMERGENCY)
Facility: HOSPITAL | Age: 20
LOS: 0 days | Discharge: HOME | End: 2020-06-10
Attending: STUDENT IN AN ORGANIZED HEALTH CARE EDUCATION/TRAINING PROGRAM | Admitting: STUDENT IN AN ORGANIZED HEALTH CARE EDUCATION/TRAINING PROGRAM
Payer: MEDICAID

## 2020-06-10 ENCOUNTER — OUTPATIENT (OUTPATIENT)
Dept: OUTPATIENT SERVICES | Facility: HOSPITAL | Age: 20
LOS: 1 days | Discharge: HOME | End: 2020-06-10

## 2020-06-10 VITALS
DIASTOLIC BLOOD PRESSURE: 83 MMHG | RESPIRATION RATE: 19 BRPM | OXYGEN SATURATION: 100 % | TEMPERATURE: 98 F | SYSTOLIC BLOOD PRESSURE: 140 MMHG | HEART RATE: 93 BPM

## 2020-06-10 DIAGNOSIS — K02.9 DENTAL CARIES, UNSPECIFIED: ICD-10-CM

## 2020-06-10 DIAGNOSIS — F22 DELUSIONAL DISORDERS: ICD-10-CM

## 2020-06-10 DIAGNOSIS — K08.89 OTHER SPECIFIED DISORDERS OF TEETH AND SUPPORTING STRUCTURES: ICD-10-CM

## 2020-06-10 DIAGNOSIS — F17.200 NICOTINE DEPENDENCE, UNSPECIFIED, UNCOMPLICATED: ICD-10-CM

## 2020-06-10 DIAGNOSIS — F84.9 PERVASIVE DEVELOPMENTAL DISORDER, UNSPECIFIED: ICD-10-CM

## 2020-06-10 PROCEDURE — 99283 EMERGENCY DEPT VISIT LOW MDM: CPT

## 2020-06-10 RX ORDER — AMOXICILLIN 250 MG/5ML
1 SUSPENSION, RECONSTITUTED, ORAL (ML) ORAL
Qty: 20 | Refills: 0
Start: 2020-06-10 | End: 2020-06-19

## 2020-06-10 RX ORDER — IBUPROFEN 200 MG
1 TABLET ORAL
Qty: 9 | Refills: 0
Start: 2020-06-10 | End: 2020-06-12

## 2020-06-10 NOTE — ED ADULT NURSE NOTE - OBJECTIVE STATEMENT
Pt. states, "I haven't brushing my teeth for a while so a tooth fell out, can I get an extraction." Frontal upper tooth break noted, c/o pain. Denies trauma to area.

## 2020-06-10 NOTE — ED PROVIDER NOTE - NS ED ROS FT
CONSTITUTIONAL: (-) fevers, (-) chills, (-) decreased appetite  ENT: see HPI  PULM: (-) cough, (-) shortness of breath, (-) stridor  GI: (-) nausea, (-) vomiting    *all other systems negative except as documented above and in the HPI*

## 2020-06-10 NOTE — ED PROVIDER NOTE - NSFOLLOWUPCLINICS_GEN_ALL_ED_FT
[facility]  Dental  [address] [place] [id number]  Phone: [phone]  Fax:   Follow Up Time: 1-3 Days

## 2020-06-10 NOTE — ED PROVIDER NOTE - ATTENDING CONTRIBUTION TO CARE
20 yo f hx schizophrenia here for upper dental pain x1 month. pt states he chipped his tooth and pain worsened the past few days. + smoker. no difficulty swallowing. no facial swelling. no f/c/n/v/bleeding/drainage.    vss  gen- NAD, aaox3  HENT- Cracked tooth #8 w/o periapical abscess. mild ttp. no discharge/bleeding. base of tongue normal. tonsils normal. speech/swallow normal.   card-rrr  lungs-ctab, no wheezing or rhonchi  neuro- full str/sensation, cn ii-xii grossly intact, normal coordination and gait  Psych- calm, cooperative, linear thought    odontalgia, likely dental carry. will d/c w/ abx,nsaid, close dental f/u

## 2020-06-10 NOTE — ED PROVIDER NOTE - OBJECTIVE STATEMENT
19 year old male w hx of schizophrenia presents to the ED with gradual onset, mild, non-radiating right upper dental pain x 1 month. Pt states pain began after he cracked this tooth, worsening over the last few days. Denies routine dental care, is currently a tobacco smoker. Pt states he is otherwise in his normal state of health, denies fevers/chills, cough, SOB, drooling, sore throat, difficulty swallowing, facial swelling, bleeding/drainage, n/v.

## 2020-06-10 NOTE — ED PROVIDER NOTE - PHYSICAL EXAMINATION
VITALS:  I have reviewed the initial vital signs.  GENERAL: Well-developed, well-nourished, in no acute distress. Nontoxic.  HEENT: Sclera clear. EOMI, PERRLA. Mucous membranes moist. +cracked tooth #8 with ttp. no abscess, bleeding, or purulent drainage. tolerating oral secretions, no drooling. no floor of mouth or submandibular swelling. no facial swelling.  NECK: supple w FROM. No cervical adenopathy.  PULM: Normal effort. No tachypnea or retractions. No stridor.  NEURO: A&Ox3. Speech clear. CN II-XII intact. No gross motor/sensory deficits.

## 2020-06-10 NOTE — ED PROVIDER NOTE - PATIENT PORTAL LINK FT
You can access the FollowMyHealth Patient Portal offered by [facility] by registering at the following website: [email or web address]. By joining [email or web address] [email or web address] portal, you will also be able to view your health information using other applications (apps) compatible with our system.

## 2020-06-11 ENCOUNTER — EMERGENCY (EMERGENCY)
Facility: HOSPITAL | Age: 20
LOS: 0 days | Discharge: HOME | End: 2020-06-11
Attending: EMERGENCY MEDICINE | Admitting: EMERGENCY MEDICINE
Payer: MEDICAID

## 2020-06-11 VITALS
OXYGEN SATURATION: 100 % | SYSTOLIC BLOOD PRESSURE: 139 MMHG | TEMPERATURE: 96 F | HEART RATE: 95 BPM | RESPIRATION RATE: 18 BRPM | DIASTOLIC BLOOD PRESSURE: 84 MMHG

## 2020-06-11 DIAGNOSIS — F84.0 AUTISTIC DISORDER: ICD-10-CM

## 2020-06-11 DIAGNOSIS — F43.10 POST-TRAUMATIC STRESS DISORDER, UNSPECIFIED: ICD-10-CM

## 2020-06-11 DIAGNOSIS — R44.1 VISUAL HALLUCINATIONS: ICD-10-CM

## 2020-06-11 DIAGNOSIS — R45.850 HOMICIDAL IDEATIONS: ICD-10-CM

## 2020-06-11 DIAGNOSIS — F41.9 ANXIETY DISORDER, UNSPECIFIED: ICD-10-CM

## 2020-06-11 DIAGNOSIS — R44.2 OTHER HALLUCINATIONS: ICD-10-CM

## 2020-06-11 DIAGNOSIS — R45.851 SUICIDAL IDEATIONS: ICD-10-CM

## 2020-06-11 PROCEDURE — 90792 PSYCH DIAG EVAL W/MED SRVCS: CPT | Mod: GC

## 2020-06-11 PROCEDURE — 99284 EMERGENCY DEPT VISIT MOD MDM: CPT

## 2020-06-11 NOTE — ED BEHAVIORAL HEALTH ASSESSMENT NOTE - SUMMARY
19M w/ undomiciled, unemployed, w/ pph PTSD and ASD, recent IPP admission at [facility] for HI, legal hx significant for 3 prior arrests for assault, no prior SA, no known pmh, presenting to the ED, bib self requesting psych evaluation. Psychiatry consulted to evaluate for suicidality.    Upon assessment, patient is not acutely suicidal, manic, psychotic or homicidal. Patient does appear anxious on interview and endorses panic attacks and nightmares in the context of pph of PTSD w/ h/o childhood trauma. Since his recent discharge from IPP, patient has been adhering to medication regimen and following up with outpatient appointments. At this time he is able to contract for safety. He does not requires nor will he benefit from IPP admission. Patient will benefit from continued outpatient follow up and current home regimen of psychotropic medications. [place], he has follow up appointment w/ Dr. Chun at [facility] mental health [facility] next Thursday.

## 2020-06-11 NOTE — ED PROVIDER NOTE - PATIENT PORTAL LINK FT
You can access the FollowMyHealth Patient Portal offered by [facility] by registering at the following website: [email or web address]. By joining [employer]’s FollowMyHealth portal, you will also be able to view your health information using other applications (apps) compatible with our system.

## 2020-06-11 NOTE — ED BEHAVIORAL HEALTH ASSESSMENT NOTE - OTHER PAST PSYCHIATRIC HISTORY (INCLUDE DETAILS REGARDING ONSET, COURSE OF ILLNESS, INPATIENT/OUTPATIENT TREATMENT)
past psychiatric diagnosis of ASD, PTSD, no SA/SIB SI-s 5/26-6/3 for HI, started on risperdal 2 mg bid, hydroxyzine 50mg PRN, recently starting seeing o/p therapist Fredy Bowman first visit yesterday; w/ follow up with psychiatrist Dr. Chun on the 6.18th.
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
## 2020-06-11 NOTE — ED BEHAVIORAL HEALTH ASSESSMENT NOTE - AXIS IV
FORM SENT TO DR. KILLIAN FOR SIGNATURE.   Occupational problems/Problem related to social environment/Housing problems

## 2020-06-11 NOTE — ED PROVIDER NOTE - OBJECTIVE STATEMENT
18 yo M with h/o schizophrenia, self-endorsed PPD, ASD, and hx of inpatient psych admission, here for HI/SI with AV hallucinations . Pt initially told triage that he had auditory and visual hallucinations but upon my evaluation pt denies any HI/SI. Reports that he was having a dream and was initially worried but now is ok. Is requesting to be discharged. When asked about the details of his dream pt is evasive and repeatedly reassures me that there was nothing concerning in the dream. Denies any illicit drug use . 20 yo M with h/o ASD, and hx of inpatient psych admission, here for HI/SI with AV hallucinations . Pt initially told triage that he had auditory and visual hallucinations but upon my evaluation pt denies any HI/SI. Reports that he was having a dream and was initially worried but now is ok. Is requesting to be discharged. When asked about the details of his dream pt is evasive and repeatedly reassures me that there was nothing concerning in the dream. Denies any illicit drug use . 18 yo M with h/o Anxiety, PTSD , ASD, and hx of inpatient psych admission, here for HI/SI with AV hallucinations . Pt initially told triage that he had auditory and visual hallucinations but upon my evaluation pt denies any HI/SI. Reports that he was having a dream and was initially worried but now is ok. Is requesting to be discharged. When asked about the details of his dream pt is evasive and repeatedly reassures me that there was nothing concerning in the dream. Denies any illicit drug use .

## 2020-06-11 NOTE — ED BEHAVIORAL HEALTH ASSESSMENT NOTE - REFERRAL / APPOINTMENT DETAILS
Dr[facility] [address] [place] [address] [phone] appointment on Thursday 6.18 at 10am

## 2020-06-11 NOTE — ED ADULT TRIAGE NOTE - CHIEF COMPLAINT QUOTE
Pt presenting with request for a psych evaluation, pt reports he is having auditory and visual hallucinations that "are sometimes homicidal and suicidal ideations" but denies any plan.  1:1 initiated in triage, pt undressed and belongings given to security

## 2020-06-11 NOTE — ED PROVIDER NOTE - NS ED ROS FT
Constitutional: (-) fever  Eyes/ENT: (-) blurry vision, (-) epistaxis  Cardiovascular: (-) chest pain, (-) syncope  Respiratory: (-) cough, (-) shortness of breath  Gastrointestinal: (-) vomiting, (-) diarrhea  Musculoskeletal: (-) neck pain, (-) back pain, (-) joint pain  Integumentary: (-) rash, (-) edema  Neurological: (-) headache  Allergic/Immunologic: (-) pruritus

## 2020-06-11 NOTE — ED BEHAVIORAL HEALTH ASSESSMENT NOTE - UNDOMICILED
currently reports living w/ friend, but no known stable living situation, with history of homelessness

## 2020-06-11 NOTE — ED BEHAVIORAL HEALTH ASSESSMENT NOTE - SAFETY PLAN DETAILS
Patient reports that if he should develop thoughts of hurting self or others he will call 911 or return to the ED
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
## 2020-06-11 NOTE — ED ADULT NURSE NOTE - OBJECTIVE STATEMENT
Pt c/o of auditory and visual hallucinations. Pt does not deny stopping psych medications & is currently on Risperidone.

## 2020-06-11 NOTE — BH SAFETY PLAN - ASK FOR HELP NAME 1
Mrs. Valerie Norton at [facility] Mr Daniel therapist at [facility] mental health opd

## 2020-06-11 NOTE — ED BEHAVIORAL HEALTH ASSESSMENT NOTE - CASE SUMMARY
Mr Butterfield is a 19 year old man with a documented history of Mood disorder , PTSD and Autism Spectrum disorder who presented to the ED for the evaluation of possible suicidal ideations. Patient does not have suicidal ideations , intent or plan. It appears that he made the suicidal statement in the context of the distress he felt from the nightmare he had and the resulting panic attack. It seems that he is compliant with his Risperdal and hydroxyzine and his outpatient psychiatry follow up. he however seems to have poor coping skills to address his stressors of his past trauma.   At this time, patient is not considered an imminent danger to himself or others and does not need inpatient psychiatric hospitalization. Patient will benefit from following up with his outpatient psychiatrist and psychotherapist on the scheduled appointments. he can continue his current psychotropic medications as needed .
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
## 2020-06-11 NOTE — ED BEHAVIORAL HEALTH ASSESSMENT NOTE - VIOLENCE PROTECTIVE FACTORS:
Engagement in treatment/Good treatment response/compliance/Insight into violence risk and need for management/treatment
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
## 2020-06-11 NOTE — ED PROVIDER NOTE - PHYSICAL EXAMINATION
CONSTITUTIONAL: anxious appearing male lying in bed   SKIN: warm, dry  HEAD: Normocephalic; atraumatic.  EYES: large pupils but equal and reactive , EOMI, normal sclera and conjunctiva   ENT: No nasal discharge; airway clear.  NECK: Supple; non tender.  CARD:  Regular rate and rhythm.   RESP: NO inc WOB   ABD: soft ntnd  EXT: Normal ROM.    NEURO: Alert, oriented, grossly unremarkable  PSYCH: Cooperative, appropriate, making eye contact.
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
## 2020-06-11 NOTE — ED PROVIDER NOTE - NSFOLLOWUPINSTRUCTIONS_ED_ALL_ED_FT
Patient to be discharged from ED. Any available test results were discussed with patient and/or family. Verbal instructions given, including instructions to return to ED immediately for any new, worsening, or concerning symptoms. Patient endorsed understanding. Written discharge instructions additionally given, including follow-up plan.    [facility] outpatient psych   June 18th   [address]    [place] [address] Patient to be discharged from ED. Any available test results were discussed with patient and/or family. Verbal instructions given, including instructions to return to ED immediately for any new, worsening, or concerning symptoms. Patient endorsed understanding. Written discharge instructions additionally given, including follow-up plan.    APPT : Alejandro bernal    [phone]    appointment on Thursday 6.18.20  at 10am

## 2020-06-18 ENCOUNTER — OUTPATIENT (OUTPATIENT)
Dept: OUTPATIENT SERVICES | Facility: HOSPITAL | Age: 20
LOS: 1 days | Discharge: HOME | End: 2020-06-18

## 2020-06-18 DIAGNOSIS — F22 DELUSIONAL DISORDERS: ICD-10-CM

## 2020-06-18 DIAGNOSIS — F84.9 PERVASIVE DEVELOPMENTAL DISORDER, UNSPECIFIED: ICD-10-CM

## 2020-06-18 PROBLEM — F84.0 AUTISTIC DISORDER: Chronic | Status: ACTIVE | Noted: 2020-06-11

## 2020-06-18 PROBLEM — F41.9 ANXIETY DISORDER, UNSPECIFIED: Chronic | Status: ACTIVE | Noted: 2020-06-11

## 2020-08-17 ENCOUNTER — APPOINTMENT (OUTPATIENT)
Dept: INTERNAL MEDICINE | Facility: CLINIC | Age: 20
End: 2020-08-17

## 2022-10-10 NOTE — PATIENT PROFILE BEHAVIORAL HEALTH - SPIRITUAL COMMUNITY, PROFILE
From: Aaron Robledo  To: Neno Almanzar  Sent: 10/10/2022 10:42 AM CDT  Subject: Covid    I just tested positive for Covid. Began with sore throat and cough two days ago. More cough than sore throat. Sinus full and pressure. Woke up this morning feeling slightly achy.  What is the next step?   none

## 2022-12-25 NOTE — ED PROVIDER NOTE - CHILD ABUSE FACILITY
[facility]
Recommend advancing diet to regular once medically feasible/General/healthful diet
Never smoker

## 2023-07-10 NOTE — PATIENT PROFILE BEHAVIORAL HEALTH - NSBHHOMTHTS_PSY_A_CORE
----- Message from NASIM Mercedes CNP sent at 7/10/2023  7:58 AM EDT -----  Labs are stable, K+ little on high end normal. Watch eating too much potassium foods.    Continue meds same
Called to patient, no answer. [facility].
Patient notified and verbalized understanding.
absent

## 2023-10-29 NOTE — PATIENT PROFILE BEHAVIORAL HEALTH - SPIRITUAL CULTURAL, CURRENT SITUATION, PROFILE
[facility].  Discharge instructions including s/s to return to ED, hydration importance and follow up care  provided to pt's parents.    Pt  verbalized understanding with no further questions and concerns.  Follow up visit with PCP encouraged.  MD's office contact information with phone number and address provided.   Copy of discharge provided to pt's parents.  Signed copy in chart.    Pt walked out of department by self with parents; pt in NAD, awake, alert, interactive and age appropriate.  VS   Vitals:    10/29/23 0202   BP: 126/59   Pulse: (!) 58   Resp: 18   Temp: 36.2 °C (97.2 °F)   SpO2: 96%                    none

## 2024-03-22 PROBLEM — Z78.9 OTHER SPECIFIED HEALTH STATUS: Chronic | Status: INACTIVE | Noted: 2019-08-16 | Resolved: 2020-06-10
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
